# Patient Record
Sex: FEMALE | Race: ASIAN | NOT HISPANIC OR LATINO | URBAN - METROPOLITAN AREA
[De-identification: names, ages, dates, MRNs, and addresses within clinical notes are randomized per-mention and may not be internally consistent; named-entity substitution may affect disease eponyms.]

---

## 2020-03-09 ENCOUNTER — INPATIENT (INPATIENT)
Facility: HOSPITAL | Age: 35
LOS: 1 days | Discharge: HOME | End: 2020-03-11
Attending: SPECIALIST | Admitting: SPECIALIST

## 2020-03-09 VITALS
RESPIRATION RATE: 20 BRPM | HEART RATE: 103 BPM | DIASTOLIC BLOOD PRESSURE: 89 MMHG | TEMPERATURE: 98 F | SYSTOLIC BLOOD PRESSURE: 148 MMHG

## 2020-03-09 DIAGNOSIS — Z3A.00 WEEKS OF GESTATION OF PREGNANCY NOT SPECIFIED: ICD-10-CM

## 2020-03-09 DIAGNOSIS — O26.899 OTHER SPECIFIED PREGNANCY RELATED CONDITIONS, UNSPECIFIED TRIMESTER: ICD-10-CM

## 2020-03-09 LAB
ALBUMIN SERPL ELPH-MCNC: 3.6 G/DL — SIGNIFICANT CHANGE UP (ref 3.3–5)
ALP SERPL-CCNC: 157 U/L — HIGH (ref 40–120)
ALT FLD-CCNC: 54 U/L — HIGH (ref 4–33)
ANION GAP SERPL CALC-SCNC: 16 MMO/L — HIGH (ref 7–14)
APPEARANCE UR: SIGNIFICANT CHANGE UP
APTT BLD: 28.5 SEC — SIGNIFICANT CHANGE UP (ref 27.5–36.3)
AST SERPL-CCNC: 35 U/L — HIGH (ref 4–32)
BACTERIA # UR AUTO: SIGNIFICANT CHANGE UP
BASOPHILS # BLD AUTO: 0.03 K/UL — SIGNIFICANT CHANGE UP (ref 0–0.2)
BASOPHILS NFR BLD AUTO: 0.3 % — SIGNIFICANT CHANGE UP (ref 0–2)
BILIRUB SERPL-MCNC: 0.6 MG/DL — SIGNIFICANT CHANGE UP (ref 0.2–1.2)
BILIRUB UR-MCNC: NEGATIVE — SIGNIFICANT CHANGE UP
BLD GP AB SCN SERPL QL: NEGATIVE — SIGNIFICANT CHANGE UP
BLOOD UR QL VISUAL: NEGATIVE — SIGNIFICANT CHANGE UP
BUN SERPL-MCNC: 6 MG/DL — LOW (ref 7–23)
CALCIUM SERPL-MCNC: 9.4 MG/DL — SIGNIFICANT CHANGE UP (ref 8.4–10.5)
CHLORIDE SERPL-SCNC: 104 MMOL/L — SIGNIFICANT CHANGE UP (ref 98–107)
CO2 SERPL-SCNC: 18 MMOL/L — LOW (ref 22–31)
COLOR SPEC: SIGNIFICANT CHANGE UP
CREAT ?TM UR-MCNC: 53.5 MG/DL — SIGNIFICANT CHANGE UP
CREAT SERPL-MCNC: 0.44 MG/DL — LOW (ref 0.5–1.3)
EOSINOPHIL # BLD AUTO: 0.04 K/UL — SIGNIFICANT CHANGE UP (ref 0–0.5)
EOSINOPHIL NFR BLD AUTO: 0.4 % — SIGNIFICANT CHANGE UP (ref 0–6)
FIBRINOGEN PPP-MCNC: 666 MG/DL — HIGH (ref 300–520)
GLUCOSE BLDC GLUCOMTR-MCNC: 93 MG/DL — SIGNIFICANT CHANGE UP (ref 70–99)
GLUCOSE SERPL-MCNC: 102 MG/DL — HIGH (ref 70–99)
GLUCOSE UR-MCNC: NEGATIVE — SIGNIFICANT CHANGE UP
HCT VFR BLD CALC: 36.9 % — SIGNIFICANT CHANGE UP (ref 34.5–45)
HGB BLD-MCNC: 12.8 G/DL — SIGNIFICANT CHANGE UP (ref 11.5–15.5)
HYALINE CASTS # UR AUTO: NEGATIVE — SIGNIFICANT CHANGE UP
IMM GRANULOCYTES NFR BLD AUTO: 0.9 % — SIGNIFICANT CHANGE UP (ref 0–1.5)
INR BLD: 0.96 — SIGNIFICANT CHANGE UP (ref 0.88–1.17)
KETONES UR-MCNC: NEGATIVE — SIGNIFICANT CHANGE UP
LDH SERPL L TO P-CCNC: 197 U/L — SIGNIFICANT CHANGE UP (ref 135–225)
LEUKOCYTE ESTERASE UR-ACNC: SIGNIFICANT CHANGE UP
LYMPHOCYTES # BLD AUTO: 1.18 K/UL — SIGNIFICANT CHANGE UP (ref 1–3.3)
LYMPHOCYTES # BLD AUTO: 13 % — SIGNIFICANT CHANGE UP (ref 13–44)
MCHC RBC-ENTMCNC: 32.3 PG — SIGNIFICANT CHANGE UP (ref 27–34)
MCHC RBC-ENTMCNC: 34.7 % — SIGNIFICANT CHANGE UP (ref 32–36)
MCV RBC AUTO: 93.2 FL — SIGNIFICANT CHANGE UP (ref 80–100)
MONOCYTES # BLD AUTO: 0.63 K/UL — SIGNIFICANT CHANGE UP (ref 0–0.9)
MONOCYTES NFR BLD AUTO: 6.9 % — SIGNIFICANT CHANGE UP (ref 2–14)
NEUTROPHILS # BLD AUTO: 7.12 K/UL — SIGNIFICANT CHANGE UP (ref 1.8–7.4)
NEUTROPHILS NFR BLD AUTO: 78.5 % — HIGH (ref 43–77)
NITRITE UR-MCNC: NEGATIVE — SIGNIFICANT CHANGE UP
NRBC # FLD: 0 K/UL — SIGNIFICANT CHANGE UP (ref 0–0)
PH UR: 7 — SIGNIFICANT CHANGE UP (ref 5–8)
PLATELET # BLD AUTO: 258 K/UL — SIGNIFICANT CHANGE UP (ref 150–400)
PMV BLD: 11.9 FL — SIGNIFICANT CHANGE UP (ref 7–13)
POTASSIUM SERPL-MCNC: 3.4 MMOL/L — LOW (ref 3.5–5.3)
POTASSIUM SERPL-SCNC: 3.4 MMOL/L — LOW (ref 3.5–5.3)
PROT SERPL-MCNC: 7.3 G/DL — SIGNIFICANT CHANGE UP (ref 6–8.3)
PROT UR-MCNC: 7.8 MG/DL — SIGNIFICANT CHANGE UP
PROT UR-MCNC: NEGATIVE — SIGNIFICANT CHANGE UP
PROTHROM AB SERPL-ACNC: 11 SEC — SIGNIFICANT CHANGE UP (ref 9.8–13.1)
RBC # BLD: 3.96 M/UL — SIGNIFICANT CHANGE UP (ref 3.8–5.2)
RBC # FLD: 13.8 % — SIGNIFICANT CHANGE UP (ref 10.3–14.5)
RBC CASTS # UR COMP ASSIST: SIGNIFICANT CHANGE UP (ref 0–?)
RH IG SCN BLD-IMP: POSITIVE — SIGNIFICANT CHANGE UP
RH IG SCN BLD-IMP: POSITIVE — SIGNIFICANT CHANGE UP
SODIUM SERPL-SCNC: 138 MMOL/L — SIGNIFICANT CHANGE UP (ref 135–145)
SP GR SPEC: 1.01 — SIGNIFICANT CHANGE UP (ref 1–1.04)
SQUAMOUS # UR AUTO: SIGNIFICANT CHANGE UP
URATE SERPL-MCNC: 4.6 MG/DL — SIGNIFICANT CHANGE UP (ref 2.5–7)
UROBILINOGEN FLD QL: NORMAL — SIGNIFICANT CHANGE UP
WBC # BLD: 9.08 K/UL — SIGNIFICANT CHANGE UP (ref 3.8–10.5)
WBC # FLD AUTO: 9.08 K/UL — SIGNIFICANT CHANGE UP (ref 3.8–10.5)
WBC UR QL: HIGH (ref 0–?)

## 2020-03-09 RX ORDER — SIMETHICONE 80 MG/1
80 TABLET, CHEWABLE ORAL EVERY 4 HOURS
Refills: 0 | Status: DISCONTINUED | OUTPATIENT
Start: 2020-03-09 | End: 2020-03-11

## 2020-03-09 RX ORDER — AMPICILLIN TRIHYDRATE 250 MG
1 CAPSULE ORAL EVERY 4 HOURS
Refills: 0 | Status: DISCONTINUED | OUTPATIENT
Start: 2020-03-09 | End: 2020-03-09

## 2020-03-09 RX ORDER — MAGNESIUM HYDROXIDE 400 MG/1
30 TABLET, CHEWABLE ORAL
Refills: 0 | Status: DISCONTINUED | OUTPATIENT
Start: 2020-03-09 | End: 2020-03-11

## 2020-03-09 RX ORDER — OXYTOCIN 10 UNIT/ML
333.33 VIAL (ML) INJECTION
Qty: 20 | Refills: 0 | Status: DISCONTINUED | OUTPATIENT
Start: 2020-03-09 | End: 2020-03-09

## 2020-03-09 RX ORDER — IBUPROFEN 200 MG
600 TABLET ORAL EVERY 6 HOURS
Refills: 0 | Status: COMPLETED | OUTPATIENT
Start: 2020-03-09 | End: 2021-02-05

## 2020-03-09 RX ORDER — SODIUM CHLORIDE 9 MG/ML
1000 INJECTION, SOLUTION INTRAVENOUS
Refills: 0 | Status: DISCONTINUED | OUTPATIENT
Start: 2020-03-09 | End: 2020-03-09

## 2020-03-09 RX ORDER — SODIUM CHLORIDE 9 MG/ML
3 INJECTION INTRAMUSCULAR; INTRAVENOUS; SUBCUTANEOUS EVERY 8 HOURS
Refills: 0 | Status: DISCONTINUED | OUTPATIENT
Start: 2020-03-09 | End: 2020-03-11

## 2020-03-09 RX ORDER — ACETAMINOPHEN 500 MG
975 TABLET ORAL
Refills: 0 | Status: DISCONTINUED | OUTPATIENT
Start: 2020-03-09 | End: 2020-03-11

## 2020-03-09 RX ORDER — PRAMOXINE HYDROCHLORIDE 150 MG/15G
1 AEROSOL, FOAM RECTAL EVERY 4 HOURS
Refills: 0 | Status: DISCONTINUED | OUTPATIENT
Start: 2020-03-09 | End: 2020-03-11

## 2020-03-09 RX ORDER — HYDROCORTISONE 1 %
1 OINTMENT (GRAM) TOPICAL EVERY 6 HOURS
Refills: 0 | Status: DISCONTINUED | OUTPATIENT
Start: 2020-03-09 | End: 2020-03-11

## 2020-03-09 RX ORDER — OXYCODONE HYDROCHLORIDE 5 MG/1
5 TABLET ORAL ONCE
Refills: 0 | Status: DISCONTINUED | OUTPATIENT
Start: 2020-03-09 | End: 2020-03-11

## 2020-03-09 RX ORDER — BENZOCAINE 10 %
1 GEL (GRAM) MUCOUS MEMBRANE EVERY 6 HOURS
Refills: 0 | Status: DISCONTINUED | OUTPATIENT
Start: 2020-03-09 | End: 2020-03-11

## 2020-03-09 RX ORDER — KETOROLAC TROMETHAMINE 30 MG/ML
30 SYRINGE (ML) INJECTION ONCE
Refills: 0 | Status: DISCONTINUED | OUTPATIENT
Start: 2020-03-09 | End: 2020-03-09

## 2020-03-09 RX ORDER — DIBUCAINE 1 %
1 OINTMENT (GRAM) RECTAL EVERY 6 HOURS
Refills: 0 | Status: DISCONTINUED | OUTPATIENT
Start: 2020-03-09 | End: 2020-03-11

## 2020-03-09 RX ORDER — OXYTOCIN 10 UNIT/ML
333.33 VIAL (ML) INJECTION
Qty: 20 | Refills: 0 | Status: DISCONTINUED | OUTPATIENT
Start: 2020-03-09 | End: 2020-03-10

## 2020-03-09 RX ORDER — OXYCODONE HYDROCHLORIDE 5 MG/1
5 TABLET ORAL
Refills: 0 | Status: DISCONTINUED | OUTPATIENT
Start: 2020-03-09 | End: 2020-03-11

## 2020-03-09 RX ORDER — AMPICILLIN TRIHYDRATE 250 MG
2 CAPSULE ORAL ONCE
Refills: 0 | Status: COMPLETED | OUTPATIENT
Start: 2020-03-09 | End: 2020-03-09

## 2020-03-09 RX ORDER — OXYTOCIN 10 UNIT/ML
2 VIAL (ML) INJECTION
Qty: 30 | Refills: 0 | Status: DISCONTINUED | OUTPATIENT
Start: 2020-03-09 | End: 2020-03-09

## 2020-03-09 RX ORDER — IBUPROFEN 200 MG
600 TABLET ORAL EVERY 6 HOURS
Refills: 0 | Status: DISCONTINUED | OUTPATIENT
Start: 2020-03-09 | End: 2020-03-11

## 2020-03-09 RX ORDER — LANOLIN
1 OINTMENT (GRAM) TOPICAL EVERY 6 HOURS
Refills: 0 | Status: DISCONTINUED | OUTPATIENT
Start: 2020-03-09 | End: 2020-03-11

## 2020-03-09 RX ORDER — GLYCERIN ADULT
1 SUPPOSITORY, RECTAL RECTAL AT BEDTIME
Refills: 0 | Status: DISCONTINUED | OUTPATIENT
Start: 2020-03-09 | End: 2020-03-11

## 2020-03-09 RX ORDER — DIPHENHYDRAMINE HCL 50 MG
25 CAPSULE ORAL EVERY 6 HOURS
Refills: 0 | Status: DISCONTINUED | OUTPATIENT
Start: 2020-03-09 | End: 2020-03-11

## 2020-03-09 RX ORDER — AER TRAVELER 0.5 G/1
1 SOLUTION RECTAL; TOPICAL EVERY 4 HOURS
Refills: 0 | Status: DISCONTINUED | OUTPATIENT
Start: 2020-03-09 | End: 2020-03-11

## 2020-03-09 RX ORDER — TETANUS TOXOID, REDUCED DIPHTHERIA TOXOID AND ACELLULAR PERTUSSIS VACCINE, ADSORBED 5; 2.5; 8; 8; 2.5 [IU]/.5ML; [IU]/.5ML; UG/.5ML; UG/.5ML; UG/.5ML
0.5 SUSPENSION INTRAMUSCULAR ONCE
Refills: 0 | Status: DISCONTINUED | OUTPATIENT
Start: 2020-03-09 | End: 2020-03-11

## 2020-03-09 RX ADMIN — SODIUM CHLORIDE 3 MILLILITER(S): 9 INJECTION INTRAMUSCULAR; INTRAVENOUS; SUBCUTANEOUS at 21:41

## 2020-03-09 RX ADMIN — Medication 1 APPLICATION(S): at 23:10

## 2020-03-09 RX ADMIN — Medication 2 MILLIUNIT(S)/MIN: at 18:00

## 2020-03-09 RX ADMIN — PRAMOXINE HYDROCHLORIDE 1 APPLICATION(S): 150 AEROSOL, FOAM RECTAL at 23:10

## 2020-03-09 RX ADMIN — SODIUM CHLORIDE 125 MILLILITER(S): 9 INJECTION, SOLUTION INTRAVENOUS at 18:00

## 2020-03-09 RX ADMIN — Medication 216 GRAM(S): at 15:38

## 2020-03-09 RX ADMIN — Medication 600 MILLIGRAM(S): at 23:10

## 2020-03-09 RX ADMIN — Medication 1 SPRAY(S): at 23:09

## 2020-03-09 RX ADMIN — AER TRAVELER 1 APPLICATION(S): 0.5 SOLUTION RECTAL; TOPICAL at 23:09

## 2020-03-09 NOTE — PROVIDER CONTACT NOTE (OTHER) - ASSESSMENT
Patient symptomatic. Ammonia inhalant and po fluids given. Patient with reports of "feeling much better." Repeat vitals WNL.

## 2020-03-09 NOTE — OB PROVIDER TRIAGE NOTE - HISTORY OF PRESENT ILLNESS
33yo  female  @ 39.1 wks SLIUP GDMA1 here co ctx's Q8-10 min apart. Pt is intact with GFM.     Pmhx-denies  Pshx/Hosp-denies  Meds-PNV  NKDA  Past ob-12/3/59285#9  FT GDMA1  Gyn-denies

## 2020-03-09 NOTE — PROVIDER CONTACT NOTE (OTHER) - SITUATION
NSD from 3/9 at 1948. Patient's orthostatic blood pressures negative. After voiding, patient with complaints of dizziness and observed to be diaphoretic.  Patient given ammonia inhalant & vitals done.

## 2020-03-09 NOTE — CHART NOTE - NSCHARTNOTEFT_GEN_A_CORE
Pt with persistent severe range BPs.  On admission, pt met criteria for sPEC by BP without consecutive severe range BPs.   Pt was started on Mg and received procardia 30xL.   Pt then had two severe range BPs. Hydral 5 pushed.   Repeat 171/82. Hydral 10 pushed.   Repeat BP pending.  HELLP labs pending.   Cat 1 tracing.   IOL not yet started.   julia [facility] attendings and Radha PGY4   Dr. Asmita Sim PGY1

## 2020-03-09 NOTE — OB RN PATIENT PROFILE - ALERT: PERTINENT HISTORY
20 Week Level II Sonogram/1st Trimester Sonogram/Fetal Non-Stress Test (NST)/Ultra Screen at 12 Weeks

## 2020-03-09 NOTE — DISCHARGE NOTE OB - PATIENT PORTAL LINK FT
You can access the FollowMyHealth Patient Portal offered by [facility] by registering at the following website: [email or web address]. By joining [facility]’s FollowMyHealth portal, you will also be able to view your health information using other applications (apps) compatible with our system.

## 2020-03-09 NOTE — OB PROVIDER TRIAGE NOTE - NSHPPHYSICALEXAM_GEN_ALL_CORE
Gen: A&O x 3; NAD  Vitals: BP-148/90; 140/94             P-93; T-36.8    Pulm-CTA B/L; no wheezes/rales/ronchi  Cor-Clear S1S2; no murmurs appreciated  Abd exam-soft and nontender  NST cat I with 140 baseline and mod variability; irreg ctx's    EFW~3118  VE=7.5/90/-2

## 2020-03-09 NOTE — PROGRESS NOTE ADULT - SUBJECTIVE AND OBJECTIVE BOX
C/O contraction  every 10 min , requesting  epidural       Fetal tracing  jaxson gory 1    Benson- contraction  q 10 min .    V/E- 7 cm/90 %/-2 / intact    Plan     epidural for pain management   Pitocin for  augmentation  of labor  Penicillin for  GBS prophylaxis  AROM after 2 nd dose of antibiotic

## 2020-03-09 NOTE — DISCHARGE NOTE OB - CARE PROVIDER_API CALL
Rebecca Mcfarlane)  Obstetrics and Gynecology  [address] [place] [address], [address]  [place] [address]  Phone: [phone]  Fax: [phone]  Follow Up Time:

## 2020-03-09 NOTE — OB PROVIDER H&P - HISTORY OF PRESENT ILLNESS
33yo  female  @ 39.1 wks SLIUP GDMA1 here co ctx's Q8-10 min apart. Pt is intact with GFM.     Pmhx-denies  Pshx/Hosp-denies  Meds-PNV  NKDA  Past ob-12/3/11479#9  FT GDMA1  Gyn-denies

## 2020-03-09 NOTE — OB PROVIDER H&P - ASSESSMENT
35yo  female  @ 39.1 wks SLIUP GDMA2 here co ctx's  -VE-7.5/90/-2  -GBS positive  -pt with some labile BP's; denies sx's of pre-eclampsia  -HELLP labs sent  -pt was dw Dr Mcfarlane; coming in for delivery  -pt unsure if she wants epidural @ this time

## 2020-03-09 NOTE — PROVIDER CONTACT NOTE (OTHER) - BACKGROUND
Orthostatics negative. Patient able to ambulate to bathroom without incident. After voiding, patient symptomatic.

## 2020-03-09 NOTE — OB RN DELIVERY SUMMARY - NS_SEPSISRSKCALC_OBGYN_ALL_OB_FT
EOS calculated successfully. EOS Risk Factor: 0.5/1000 live births ([facility] national incidence); GA=39w1d; Temp=98.2; ROM=0.617; GBS='Positive'; Antibiotics='GBS specific antibiotics > 2 hrs prior to birth'

## 2020-03-09 NOTE — OB RN PATIENT PROFILE - NSPRENATALGBS_OBGYN_ALL_OB_GET_DAYS
CBC Full  -  ( 14 Jul 2017 20:50 )  WBC Count : 10.37 K/uL  Hemoglobin : 12.6 g/dL  Hematocrit : 37.6 %  Platelet Count - Automated : 124 K/uL  Mean Cell Volume : 87.2 fL  Mean Cell Hemoglobin : 29.2 pg  Mean Cell Hemoglobin Concentration : 33.5 %  Auto Neutrophil # : 4.77 K/uL  Auto Lymphocyte # : 4.87 K/uL  Auto Monocyte # : 0.54 K/uL  Auto Eosinophil # : 0.11 K/uL  Auto Basophil # : 0.05 K/uL  Auto Neutrophil % : 45.9 %  Auto Lymphocyte % : 47.0 %  Auto Monocyte % : 5.2 %  Auto Eosinophil % : 1.1 %  Auto Basophil % : 0.5 %    07-14    140  |  102  |  14  ----------------------------<  90  4.2   |  18<L>  |  0.33    Ca    9.9      14 Jul 2017 20:50    TPro  8.0  /  Alb  4.6  /  TBili  0.2  /  DBili  x   /  AST  40<H>  /  ALT  15  /  AlkPhos  227  07-14    U/S: abscess on LLE
21

## 2020-03-09 NOTE — OB PROVIDER TRIAGE NOTE - NSOBPROVIDERNOTE_OBGYN_ALL_OB_FT
33yo  female  @ 39.1 wks SLIUP GDMA2 here co ctx's  -VE-7.5/90/-2  -GBS positive  -pt with some labile BP's; denies sx's of pre-eclampsia  -HELLP labs sent  -pt was dw Dr Mcfarlane; coming in for delivery  -pt unsure if she wants epidural @ this time

## 2020-03-10 LAB — T PALLIDUM AB TITR SER: NEGATIVE — SIGNIFICANT CHANGE UP

## 2020-03-10 RX ADMIN — Medication 975 MILLIGRAM(S): at 23:12

## 2020-03-10 RX ADMIN — SODIUM CHLORIDE 3 MILLILITER(S): 9 INJECTION INTRAMUSCULAR; INTRAVENOUS; SUBCUTANEOUS at 05:46

## 2020-03-10 RX ADMIN — Medication 975 MILLIGRAM(S): at 15:32

## 2020-03-10 RX ADMIN — Medication 600 MILLIGRAM(S): at 05:49

## 2020-03-10 RX ADMIN — Medication 600 MILLIGRAM(S): at 00:07

## 2020-03-10 RX ADMIN — Medication 975 MILLIGRAM(S): at 16:00

## 2020-03-10 RX ADMIN — Medication 600 MILLIGRAM(S): at 06:21

## 2020-03-11 VITALS
RESPIRATION RATE: 18 BRPM | HEART RATE: 77 BPM | OXYGEN SATURATION: 100 % | SYSTOLIC BLOOD PRESSURE: 114 MMHG | TEMPERATURE: 98 F | DIASTOLIC BLOOD PRESSURE: 71 MMHG

## 2020-03-11 RX ORDER — IBUPROFEN 200 MG
1 TABLET ORAL
Qty: 0 | Refills: 0 | DISCHARGE
Start: 2020-03-11

## 2020-03-11 RX ORDER — ACETAMINOPHEN 500 MG
3 TABLET ORAL
Qty: 0 | Refills: 0 | DISCHARGE
Start: 2020-03-11

## 2020-03-11 RX ADMIN — Medication 975 MILLIGRAM(S): at 00:00

## 2020-03-11 RX ADMIN — Medication 600 MILLIGRAM(S): at 09:00

## 2020-03-11 RX ADMIN — Medication 600 MILLIGRAM(S): at 08:35

## 2020-04-26 ENCOUNTER — MESSAGE (OUTPATIENT)
Age: 35
End: 2020-04-26

## 2021-04-30 NOTE — OB RN PATIENT PROFILE - PRESSURE ULCER(S)
CC:  Elissa Jones is here today for Complete Physical.    Medications: currently is not taking any medications  Refills needed today?  NO  Denies known Latex allergy or symptoms of Latex sensitivity.  Patient would like communication of their results via:      Cell Phone:   Telephone Information:   Mobile [phone]     Okay to leave a message containing results? Yes  Tobacco history: verified    Health Maintenance Due   Topic Date Due   • Shingles Vaccine (1 of 2) Never done     Patient is due for topics as listed above but is not proceeding with Immunization(s) Shingles at this time. Education provided for Immunization(s) Shingles..    [facility] status addressed. Patient Active.  Allergies reviewed.            no

## 2022-04-19 ENCOUNTER — OUTPATIENT (OUTPATIENT)
Dept: INPATIENT UNIT | Facility: HOSPITAL | Age: 37
LOS: 1 days | Discharge: HOME | End: 2022-04-19

## 2022-04-19 VITALS
SYSTOLIC BLOOD PRESSURE: 126 MMHG | RESPIRATION RATE: 16 BRPM | HEART RATE: 99 BPM | DIASTOLIC BLOOD PRESSURE: 84 MMHG | TEMPERATURE: 98 F

## 2022-04-19 VITALS — DIASTOLIC BLOOD PRESSURE: 76 MMHG | SYSTOLIC BLOOD PRESSURE: 112 MMHG | HEART RATE: 94 BPM

## 2022-04-19 DIAGNOSIS — Z3A.00 WEEKS OF GESTATION OF PREGNANCY NOT SPECIFIED: ICD-10-CM

## 2022-04-19 DIAGNOSIS — O26.899 OTHER SPECIFIED PREGNANCY RELATED CONDITIONS, UNSPECIFIED TRIMESTER: ICD-10-CM

## 2022-04-19 LAB
BLD GP AB SCN SERPL QL: NEGATIVE — SIGNIFICANT CHANGE UP
FIBRINOGEN PPP-MCNC: 672 MG/DL — HIGH (ref 330–520)
HCT VFR BLD CALC: 38.4 % — SIGNIFICANT CHANGE UP (ref 34.5–45)
HGB BLD-MCNC: 13 G/DL — SIGNIFICANT CHANGE UP (ref 11.5–15.5)
MCHC RBC-ENTMCNC: 31.9 PG — SIGNIFICANT CHANGE UP (ref 27–34)
MCHC RBC-ENTMCNC: 33.9 GM/DL — SIGNIFICANT CHANGE UP (ref 32–36)
MCV RBC AUTO: 94.3 FL — SIGNIFICANT CHANGE UP (ref 80–100)
NRBC # BLD: 0 /100 WBCS — SIGNIFICANT CHANGE UP
NRBC # FLD: 0 K/UL — SIGNIFICANT CHANGE UP
PLATELET # BLD AUTO: 231 K/UL — SIGNIFICANT CHANGE UP (ref 150–400)
RBC # BLD: 4.07 M/UL — SIGNIFICANT CHANGE UP (ref 3.8–5.2)
RBC # FLD: 13.5 % — SIGNIFICANT CHANGE UP (ref 10.3–14.5)
RH IG SCN BLD-IMP: POSITIVE — SIGNIFICANT CHANGE UP
RH IG SCN BLD-IMP: POSITIVE — SIGNIFICANT CHANGE UP
WBC # BLD: 7.92 K/UL — SIGNIFICANT CHANGE UP (ref 3.8–10.5)
WBC # FLD AUTO: 7.92 K/UL — SIGNIFICANT CHANGE UP (ref 3.8–10.5)

## 2022-04-19 PROCEDURE — 99203 OFFICE O/P NEW LOW 30 MIN: CPT

## 2022-04-19 RX ORDER — ACETAMINOPHEN 500 MG
1000 TABLET ORAL ONCE
Refills: 0 | Status: COMPLETED | OUTPATIENT
Start: 2022-04-19 | End: 2022-04-19

## 2022-04-19 RX ADMIN — Medication 1000 MILLIGRAM(S): at 20:56

## 2022-04-19 RX ADMIN — Medication 1000 MILLIGRAM(S): at 20:19

## 2022-04-19 NOTE — OB RN TRIAGE NOTE - FALL HARM RISK - CONCLUSION
Is This A New Presentation, Or A Follow-Up?: Skin Lesion What Type Of Note Output Would You Prefer (Optional)?: Bullet Format How Severe Is Your Skin Lesion?: mild Has Your Skin Lesion Been Treated?: not been treated Universal Safety Interventions

## 2022-04-19 NOTE — OB PROVIDER TRIAGE NOTE - NSHPLABSRESULTS_GEN_ALL_CORE
CBC:                 13.0   7.92  )-----------( 231      ( 19 Apr 2022 19:50 )             38.4     fibrinogen: 672    type and screen: O+

## 2022-04-19 NOTE — OB PROVIDER TRIAGE NOTE - NSHPPHYSICALEXAM_GEN_ALL_CORE
PHYSICAL EXAM  Vital Signs: Vital Signs Last 24 Hrs  T(C): 36.7 (19 Apr 2022 18:24), Max: 36.7 (19 Apr 2022 16:45)  T(F): 98.06 (19 Apr 2022 18:24), Max: 98.1 (19 Apr 2022 16:45)  HR: 88 (19 Apr 2022 18:56) (88 - 103)  BP: 119/76 (19 Apr 2022 18:56) (119/76 - 128/86)  RR: 16 (19 Apr 2022 16:45) (16 - 16)    Gen: NAD  Head: NC/AT  Cardio: S1S2+, RRR  Resp: CTABL, no wheezing  Abdomen: Soft, Non tender, + bowel sound  Extremities: No LE edema bilaterally    NST done to assess fetal surveillance and results as follows:  NST-->FHR: 150 HR baseline, moderate variability, accelerations present, no decelerations, Category 1.  [place]: none noted

## 2022-04-19 NOTE — OB PROVIDER TRIAGE NOTE - NSOBPROVIDERNOTE_OBGYN_ALL_OB_FT
38 yo , EGA@35.4 weeks, presented to D&T with c/o of fall last night at 9PM (Pt report that she slipped on toys and landed on her buttock, Pt complained of constant pelvic pain /10 denies vaginal bleeding,   CBC, Fibrinogen T&S, KB sent result pending. 36 yo , EGA@35.4 weeks, presented to D&T with c/o of fall last night at 9PM (Pt report that she slipped on toys and landed on her buttock, Pt complained of constant pelvic pain 7/10 denies vaginal bleeding,   CBC, Fibrinogen T&S, KB sent result pending.  o+ 38 yo , EGA@35.4 weeks, presented to D&T with c/o of fall last night at 9PM (Pt report that she slipped on toys and landed on her buttock, Pt complained of constant pelvic pain 7/10 denies vaginal bleeding,   CBC, Fibrinogen T&S, KB sent result pending.    19:15 received report from day team:  SVE: closed, long. no evidence bleeding  TAS: anterior placenta, vertex, DREW 9.09, BPP 8/8  EFM: 145 baseline, mod variability, pos accels, no decels  [place]: irregular contractions   blood type - o+  CBC, fibrinogen pending   ordered Tylenol PO 38 yo , EGA@35.4 weeks, presented to D&T with c/o of fall last night at 9PM (Pt report that she slipped on toys and landed on her buttock, Pt complained of constant pelvic pain /10 denies vaginal bleeding,   CBC, Fibrinogen T&S, KB sent result pending.    19:15 received report from day team:  SVE: closed, long. no evidence bleeding  TAS: anterior placenta, vertex, DREW 9.09, BPP 8/8  EFM: 145 baseline, mod variability, pos accels, no decels  [place]: irregular contractions   ordered Tylenol PO--> pt states her pain is relieved   blood type - o+  CBC, fibrinogen - WNL    d/w Dr Sonja Tran --> discharge home  discussed discharge papers with pt, she verbalized understanding need to return with concerns/change in symptoms

## 2022-04-19 NOTE — OB RN TRIAGE NOTE - FALL HARM RISK - UNIVERSAL INTERVENTIONS
Bed in lowest position, wheels locked, appropriate side rails in place/Call bell, personal items and telephone in reach/Instruct patient to call for assistance before getting out of bed or chair/Non-slip footwear when patient is out of bed/[place] to call system/Physically safe environment - no spills, clutter or unnecessary equipment/Purposeful Proactive Rounding/Room/bathroom lighting operational, light cord in reach

## 2022-04-19 NOTE — OB PROVIDER TRIAGE NOTE - HISTORY OF PRESENT ILLNESS
38 yo , EGA@35.4 weeks, presented to D&T with c/o of fall last night at 9PM (Pt report that she slipped on toys and landed on her buttock, Pt complained of constant pelvic pain 7/10 denies vaginal bleeding, leakage of fluid, and reports fetal movement.  Denies fever, chills, headaches, changes in vision, chest pain, palpitations, shortness of breath, cough, nausea, vomiting, diarrhea, constipation, urinary symptoms, edema.      GBS status is negative.  Patient denies signs and symptoms of COVID 19; denies symptomatic illness; fully vaccinated.    No adverse reactions to anesthesia, no objections to blood transfusions if clinically indicated.  OB hx: primigravida  Med hx:  Surg hx:  GYN hx: denies hx of abnormal papsmear/cysts/fibroids/STDs  Meds:  Allergies:    Social hx: Denies alcohol, tobacco, drug use  Psych hx: denies hx of anxiety/depression; lives with spouse   38 yo , EGA@35.4 weeks, presented to D&T with c/o of fall last night at 9PM (Pt report that she slipped on toys and landed on her buttock, Pt complained of constant pelvic pain 7/10 denies vaginal bleeding,  Pt denies leakage of fluid, and reports fetal movement.  Denies fever, chills, headaches, changes in vision, chest pain, palpitations, shortness of breath, cough, nausea, vomiting, diarrhea, constipation, urinary symptoms, edema.      GBS status is unknown  Patient denies signs and symptoms of COVID 19; denies symptomatic illness    No adverse reactions to anesthesia, no objections to blood transfusions if clinically indicated.  OB hx:    x2  12/3/2017, 3/9/2020 complicated with GDMA1  Med hx: denies  Surg hx: denies  GYN hx: denies hx of abnormal papsmear/cysts/fibroids/STDs  Meds: PNV  Allergies: NKDA    Social hx: Denies alcohol, tobacco, drug use  Psych hx: denies hx of anxiety/depression; lives with spouse

## 2022-05-06 ENCOUNTER — TRANSCRIPTION ENCOUNTER (OUTPATIENT)
Age: 37
End: 2022-05-06

## 2022-05-06 ENCOUNTER — INPATIENT (INPATIENT)
Facility: HOSPITAL | Age: 37
LOS: 1 days | Discharge: HOME | End: 2022-05-08
Attending: SPECIALIST | Admitting: SPECIALIST

## 2022-05-06 VITALS
HEART RATE: 82 BPM | SYSTOLIC BLOOD PRESSURE: 140 MMHG | DIASTOLIC BLOOD PRESSURE: 88 MMHG | RESPIRATION RATE: 15 BRPM | TEMPERATURE: 99 F

## 2022-05-06 DIAGNOSIS — O26.899 OTHER SPECIFIED PREGNANCY RELATED CONDITIONS, UNSPECIFIED TRIMESTER: ICD-10-CM

## 2022-05-06 DIAGNOSIS — Z3A.00 WEEKS OF GESTATION OF PREGNANCY NOT SPECIFIED: ICD-10-CM

## 2022-05-06 LAB
ALBUMIN SERPL ELPH-MCNC: 3.2 G/DL — LOW (ref 3.3–5)
ALP SERPL-CCNC: 141 U/L — HIGH (ref 40–120)
ALT FLD-CCNC: 58 U/L — HIGH (ref 4–33)
ANION GAP SERPL CALC-SCNC: 13 MMOL/L — SIGNIFICANT CHANGE UP (ref 7–14)
APTT BLD: 25.2 SEC — LOW (ref 27–36.3)
AST SERPL-CCNC: 40 U/L — HIGH (ref 4–32)
BASOPHILS # BLD AUTO: 0.02 K/UL — SIGNIFICANT CHANGE UP (ref 0–0.2)
BASOPHILS # BLD AUTO: 0.02 K/UL — SIGNIFICANT CHANGE UP (ref 0–0.2)
BASOPHILS NFR BLD AUTO: 0.1 % — SIGNIFICANT CHANGE UP (ref 0–2)
BASOPHILS NFR BLD AUTO: 0.2 % — SIGNIFICANT CHANGE UP (ref 0–2)
BILIRUB SERPL-MCNC: 0.6 MG/DL — SIGNIFICANT CHANGE UP (ref 0.2–1.2)
BLD GP AB SCN SERPL QL: NEGATIVE — SIGNIFICANT CHANGE UP
BUN SERPL-MCNC: 6 MG/DL — LOW (ref 7–23)
CALCIUM SERPL-MCNC: 8.7 MG/DL — SIGNIFICANT CHANGE UP (ref 8.4–10.5)
CHLORIDE SERPL-SCNC: 104 MMOL/L — SIGNIFICANT CHANGE UP (ref 98–107)
CO2 SERPL-SCNC: 17 MMOL/L — LOW (ref 22–31)
COVID-19 SPIKE DOMAIN AB INTERP: POSITIVE
COVID-19 SPIKE DOMAIN ANTIBODY RESULT: >250 U/ML — HIGH
CREAT SERPL-MCNC: 0.4 MG/DL — LOW (ref 0.5–1.3)
EGFR: 132 ML/MIN/1.73M2 — SIGNIFICANT CHANGE UP
EOSINOPHIL # BLD AUTO: 0.01 K/UL — SIGNIFICANT CHANGE UP (ref 0–0.5)
EOSINOPHIL # BLD AUTO: 0.16 K/UL — SIGNIFICANT CHANGE UP (ref 0–0.5)
EOSINOPHIL NFR BLD AUTO: 0.1 % — SIGNIFICANT CHANGE UP (ref 0–6)
EOSINOPHIL NFR BLD AUTO: 1.9 % — SIGNIFICANT CHANGE UP (ref 0–6)
FIBRINOGEN PPP-MCNC: 572 MG/DL — HIGH (ref 330–520)
GLUCOSE BLDC GLUCOMTR-MCNC: 101 MG/DL — HIGH (ref 70–99)
GLUCOSE BLDC GLUCOMTR-MCNC: 102 MG/DL — HIGH (ref 70–99)
GLUCOSE BLDC GLUCOMTR-MCNC: 166 MG/DL — HIGH (ref 70–99)
GLUCOSE SERPL-MCNC: 179 MG/DL — HIGH (ref 70–99)
HCT VFR BLD CALC: 34.6 % — SIGNIFICANT CHANGE UP (ref 34.5–45)
HCT VFR BLD CALC: 37.3 % — SIGNIFICANT CHANGE UP (ref 34.5–45)
HGB BLD-MCNC: 11.8 G/DL — SIGNIFICANT CHANGE UP (ref 11.5–15.5)
HGB BLD-MCNC: 12.9 G/DL — SIGNIFICANT CHANGE UP (ref 11.5–15.5)
IANC: 12.29 K/UL — HIGH (ref 1.8–7.4)
IANC: 5.5 K/UL — SIGNIFICANT CHANGE UP (ref 1.8–7.4)
IMM GRANULOCYTES NFR BLD AUTO: 0.6 % — SIGNIFICANT CHANGE UP (ref 0–1.5)
IMM GRANULOCYTES NFR BLD AUTO: 1.1 % — SIGNIFICANT CHANGE UP (ref 0–1.5)
INR BLD: 1.01 RATIO — SIGNIFICANT CHANGE UP (ref 0.88–1.16)
LDH SERPL L TO P-CCNC: 174 U/L — SIGNIFICANT CHANGE UP (ref 135–225)
LYMPHOCYTES # BLD AUTO: 1.47 K/UL — SIGNIFICANT CHANGE UP (ref 1–3.3)
LYMPHOCYTES # BLD AUTO: 1.78 K/UL — SIGNIFICANT CHANGE UP (ref 1–3.3)
LYMPHOCYTES # BLD AUTO: 10.3 % — LOW (ref 13–44)
LYMPHOCYTES # BLD AUTO: 21.6 % — SIGNIFICANT CHANGE UP (ref 13–44)
MCHC RBC-ENTMCNC: 32.3 PG — SIGNIFICANT CHANGE UP (ref 27–34)
MCHC RBC-ENTMCNC: 32.5 PG — SIGNIFICANT CHANGE UP (ref 27–34)
MCHC RBC-ENTMCNC: 34.1 GM/DL — SIGNIFICANT CHANGE UP (ref 32–36)
MCHC RBC-ENTMCNC: 34.6 GM/DL — SIGNIFICANT CHANGE UP (ref 32–36)
MCV RBC AUTO: 93.3 FL — SIGNIFICANT CHANGE UP (ref 80–100)
MCV RBC AUTO: 95.3 FL — SIGNIFICANT CHANGE UP (ref 80–100)
MONOCYTES # BLD AUTO: 0.38 K/UL — SIGNIFICANT CHANGE UP (ref 0–0.9)
MONOCYTES # BLD AUTO: 0.69 K/UL — SIGNIFICANT CHANGE UP (ref 0–0.9)
MONOCYTES NFR BLD AUTO: 2.7 % — SIGNIFICANT CHANGE UP (ref 2–14)
MONOCYTES NFR BLD AUTO: 8.4 % — SIGNIFICANT CHANGE UP (ref 2–14)
NEUTROPHILS # BLD AUTO: 12.29 K/UL — HIGH (ref 1.8–7.4)
NEUTROPHILS # BLD AUTO: 5.5 K/UL — SIGNIFICANT CHANGE UP (ref 1.8–7.4)
NEUTROPHILS NFR BLD AUTO: 66.8 % — SIGNIFICANT CHANGE UP (ref 43–77)
NEUTROPHILS NFR BLD AUTO: 86.2 % — HIGH (ref 43–77)
NRBC # BLD: 0 /100 WBCS — SIGNIFICANT CHANGE UP
NRBC # BLD: 0 /100 WBCS — SIGNIFICANT CHANGE UP
NRBC # FLD: 0 K/UL — SIGNIFICANT CHANGE UP
NRBC # FLD: 0 K/UL — SIGNIFICANT CHANGE UP
PLATELET # BLD AUTO: 190 K/UL — SIGNIFICANT CHANGE UP (ref 150–400)
PLATELET # BLD AUTO: 197 K/UL — SIGNIFICANT CHANGE UP (ref 150–400)
POTASSIUM SERPL-MCNC: 3.6 MMOL/L — SIGNIFICANT CHANGE UP (ref 3.5–5.3)
POTASSIUM SERPL-SCNC: 3.6 MMOL/L — SIGNIFICANT CHANGE UP (ref 3.5–5.3)
PROT SERPL-MCNC: 5.9 G/DL — LOW (ref 6–8.3)
PROTHROM AB SERPL-ACNC: 11.7 SEC — SIGNIFICANT CHANGE UP (ref 10.5–13.4)
RBC # BLD: 3.63 M/UL — LOW (ref 3.8–5.2)
RBC # BLD: 4 M/UL — SIGNIFICANT CHANGE UP (ref 3.8–5.2)
RBC # FLD: 13.5 % — SIGNIFICANT CHANGE UP (ref 10.3–14.5)
RBC # FLD: 13.5 % — SIGNIFICANT CHANGE UP (ref 10.3–14.5)
RH IG SCN BLD-IMP: POSITIVE — SIGNIFICANT CHANGE UP
SARS-COV-2 IGG+IGM SERPL QL IA: >250 U/ML — HIGH
SARS-COV-2 IGG+IGM SERPL QL IA: POSITIVE
SARS-COV-2 RNA SPEC QL NAA+PROBE: SIGNIFICANT CHANGE UP
SODIUM SERPL-SCNC: 134 MMOL/L — LOW (ref 135–145)
T PALLIDUM AB TITR SER: NEGATIVE — SIGNIFICANT CHANGE UP
URATE SERPL-MCNC: 5.2 MG/DL — SIGNIFICANT CHANGE UP (ref 2.5–7)
WBC # BLD: 14.25 K/UL — HIGH (ref 3.8–10.5)
WBC # BLD: 8.24 K/UL — SIGNIFICANT CHANGE UP (ref 3.8–10.5)
WBC # FLD AUTO: 14.25 K/UL — HIGH (ref 3.8–10.5)
WBC # FLD AUTO: 8.24 K/UL — SIGNIFICANT CHANGE UP (ref 3.8–10.5)

## 2022-05-06 RX ORDER — OXYTOCIN 10 UNIT/ML
333.33 VIAL (ML) INJECTION
Qty: 20 | Refills: 0 | Status: DISCONTINUED | OUTPATIENT
Start: 2022-05-06 | End: 2022-05-08

## 2022-05-06 RX ORDER — SODIUM CHLORIDE 9 MG/ML
1000 INJECTION, SOLUTION INTRAVENOUS
Refills: 0 | Status: DISCONTINUED | OUTPATIENT
Start: 2022-05-06 | End: 2022-05-06

## 2022-05-06 RX ORDER — AMPICILLIN TRIHYDRATE 250 MG
1 CAPSULE ORAL EVERY 4 HOURS
Refills: 0 | Status: DISCONTINUED | OUTPATIENT
Start: 2022-05-06 | End: 2022-05-06

## 2022-05-06 RX ORDER — BENZOCAINE 10 %
1 GEL (GRAM) MUCOUS MEMBRANE EVERY 6 HOURS
Refills: 0 | Status: DISCONTINUED | OUTPATIENT
Start: 2022-05-06 | End: 2022-05-08

## 2022-05-06 RX ORDER — AER TRAVELER 0.5 G/1
1 SOLUTION RECTAL; TOPICAL EVERY 4 HOURS
Refills: 0 | Status: DISCONTINUED | OUTPATIENT
Start: 2022-05-06 | End: 2022-05-08

## 2022-05-06 RX ORDER — ONDANSETRON 8 MG/1
4 TABLET, FILM COATED ORAL ONCE
Refills: 0 | Status: COMPLETED | OUTPATIENT
Start: 2022-05-06 | End: 2022-05-06

## 2022-05-06 RX ORDER — OXYTOCIN 10 UNIT/ML
333.33 VIAL (ML) INJECTION
Qty: 20 | Refills: 0 | Status: COMPLETED | OUTPATIENT
Start: 2022-05-06 | End: 2022-05-06

## 2022-05-06 RX ORDER — TETANUS TOXOID, REDUCED DIPHTHERIA TOXOID AND ACELLULAR PERTUSSIS VACCINE, ADSORBED 5; 2.5; 8; 8; 2.5 [IU]/.5ML; [IU]/.5ML; UG/.5ML; UG/.5ML; UG/.5ML
0.5 SUSPENSION INTRAMUSCULAR ONCE
Refills: 0 | Status: DISCONTINUED | OUTPATIENT
Start: 2022-05-06 | End: 2022-05-08

## 2022-05-06 RX ORDER — SODIUM CHLORIDE 9 MG/ML
3 INJECTION INTRAMUSCULAR; INTRAVENOUS; SUBCUTANEOUS EVERY 8 HOURS
Refills: 0 | Status: DISCONTINUED | OUTPATIENT
Start: 2022-05-06 | End: 2022-05-08

## 2022-05-06 RX ORDER — MAGNESIUM HYDROXIDE 400 MG/1
30 TABLET, CHEWABLE ORAL
Refills: 0 | Status: DISCONTINUED | OUTPATIENT
Start: 2022-05-06 | End: 2022-05-08

## 2022-05-06 RX ORDER — PRAMOXINE HYDROCHLORIDE 150 MG/15G
1 AEROSOL, FOAM RECTAL EVERY 4 HOURS
Refills: 0 | Status: DISCONTINUED | OUTPATIENT
Start: 2022-05-06 | End: 2022-05-08

## 2022-05-06 RX ORDER — OXYTOCIN 10 UNIT/ML
2 VIAL (ML) INJECTION
Qty: 30 | Refills: 0 | Status: DISCONTINUED | OUTPATIENT
Start: 2022-05-06 | End: 2022-05-06

## 2022-05-06 RX ORDER — IBUPROFEN 200 MG
600 TABLET ORAL EVERY 6 HOURS
Refills: 0 | Status: DISCONTINUED | OUTPATIENT
Start: 2022-05-06 | End: 2022-05-08

## 2022-05-06 RX ORDER — ACETAMINOPHEN 500 MG
975 TABLET ORAL
Refills: 0 | Status: DISCONTINUED | OUTPATIENT
Start: 2022-05-06 | End: 2022-05-08

## 2022-05-06 RX ORDER — DIPHENHYDRAMINE HCL 50 MG
25 CAPSULE ORAL EVERY 6 HOURS
Refills: 0 | Status: DISCONTINUED | OUTPATIENT
Start: 2022-05-06 | End: 2022-05-08

## 2022-05-06 RX ORDER — SIMETHICONE 80 MG/1
80 TABLET, CHEWABLE ORAL EVERY 4 HOURS
Refills: 0 | Status: DISCONTINUED | OUTPATIENT
Start: 2022-05-06 | End: 2022-05-08

## 2022-05-06 RX ORDER — HYDROCORTISONE 1 %
1 OINTMENT (GRAM) TOPICAL EVERY 6 HOURS
Refills: 0 | Status: DISCONTINUED | OUTPATIENT
Start: 2022-05-06 | End: 2022-05-08

## 2022-05-06 RX ORDER — AMPICILLIN TRIHYDRATE 250 MG
2 CAPSULE ORAL ONCE
Refills: 0 | Status: COMPLETED | OUTPATIENT
Start: 2022-05-06 | End: 2022-05-06

## 2022-05-06 RX ORDER — DIBUCAINE 1 %
1 OINTMENT (GRAM) RECTAL EVERY 6 HOURS
Refills: 0 | Status: DISCONTINUED | OUTPATIENT
Start: 2022-05-06 | End: 2022-05-08

## 2022-05-06 RX ORDER — KETOROLAC TROMETHAMINE 30 MG/ML
30 SYRINGE (ML) INJECTION ONCE
Refills: 0 | Status: DISCONTINUED | OUTPATIENT
Start: 2022-05-06 | End: 2022-05-06

## 2022-05-06 RX ORDER — SODIUM CHLORIDE 9 MG/ML
1000 INJECTION, SOLUTION INTRAVENOUS ONCE
Refills: 0 | Status: COMPLETED | OUTPATIENT
Start: 2022-05-06 | End: 2022-05-06

## 2022-05-06 RX ORDER — LANOLIN
1 OINTMENT (GRAM) TOPICAL EVERY 6 HOURS
Refills: 0 | Status: DISCONTINUED | OUTPATIENT
Start: 2022-05-06 | End: 2022-05-08

## 2022-05-06 RX ORDER — IBUPROFEN 200 MG
600 TABLET ORAL EVERY 6 HOURS
Refills: 0 | Status: COMPLETED | OUTPATIENT
Start: 2022-05-06 | End: 2023-04-04

## 2022-05-06 RX ADMIN — SODIUM CHLORIDE 3 MILLILITER(S): 9 INJECTION INTRAMUSCULAR; INTRAVENOUS; SUBCUTANEOUS at 15:31

## 2022-05-06 RX ADMIN — Medication 30 MILLIGRAM(S): at 15:32

## 2022-05-06 RX ADMIN — Medication 216 GRAM(S): at 07:11

## 2022-05-06 RX ADMIN — SODIUM CHLORIDE 1000 MILLILITER(S): 9 INJECTION, SOLUTION INTRAVENOUS at 13:31

## 2022-05-06 RX ADMIN — Medication 975 MILLIGRAM(S): at 21:25

## 2022-05-06 RX ADMIN — SODIUM CHLORIDE 125 MILLILITER(S): 9 INJECTION, SOLUTION INTRAVENOUS at 08:16

## 2022-05-06 RX ADMIN — Medication 975 MILLIGRAM(S): at 21:55

## 2022-05-06 RX ADMIN — Medication 1000 MILLIUNIT(S)/MIN: at 11:36

## 2022-05-06 RX ADMIN — ONDANSETRON 4 MILLIGRAM(S): 8 TABLET, FILM COATED ORAL at 08:16

## 2022-05-06 RX ADMIN — Medication 2 MILLIUNIT(S)/MIN: at 10:06

## 2022-05-06 NOTE — DISCHARGE NOTE OB - REASON FOR ADMISSION
Admitted in labor How Severe Are Your Spot(S)?: mild What Is The Reason For Today's Visit?: Full Body Skin Examination What Is The Reason For Today's Visit? (Being Monitored For X): the development of new lesions

## 2022-05-06 NOTE — CHART NOTE - NSCHARTNOTEFT_GEN_A_CORE
1317    House officer at the bedside after being notified by RN regarding positive orthostats accompanied by headache, palpitations, and an elevated BP. On eval, patient reports headache as resolved w/ position change. Denies any palpitations at this time. Denies scotomas. Denies chest pain or shortness of breath. Denies significant VB.    Gen: No acute distress. Awake. Alert  CV: Regular rate and rhythm. No murmurs appreciated  Pulm: Clear to auscultation bilaterally. No respiratory distress. No wheezes, rales, or rhonchi  Abd: Soft. Non-tender. Fundus firm w/ rightward deviation. No significant bleeding expressed w/ deep palpation    ICU Vital Signs Last 24 Hrs  T(C): 37 (06 May 2022 11:42), Max: 37.1 (06 May 2022 06:20)  T(F): 98.6 (06 May 2022 11:42), Max: 98.8 (06 May 2022 06:20)  HR: 83 (06 May 2022 13:20) (65 - 114)  BP: 136/76 (06 May 2022 13:12) (110/59 - 144/81)  BP(mean): --  ABP: --  ABP(mean): --  RR: 16 (06 May 2022 08:20) (15 - 16)  SpO2: 98% (06 May 2022 13:25) (94% - 100%)    Orthostatic VS    22 @ 13:05  Lying BP: 132/77 HR: 73   Sitting BP: 141/77 HR: 66  Standing BP: 143/70 HR: 103  Site: --   Mode: --    A/P: PPD#0 from  w/ QBL of 214cc w/ positive orthostats 1317    House officer at the bedside after being notified by RN regarding positive orthostats accompanied by headache, palpitations, and an elevated BP. On eval, patient reports headache as resolved w/ position change. Denies any palpitations at this time. Denies scotomas. Denies chest pain or shortness of breath. Denies significant VB.    Gen: No acute distress. Awake. Alert  CV: Regular rate and rhythm. No murmurs appreciated  Pulm: Clear to auscultation bilaterally. No respiratory distress. No wheezes, rales, or rhonchi  Abd: Soft. Non-tender. Fundus firm w/ rightward deviation. No significant bleeding expressed w/ deep palpation    ICU Vital Signs Last 24 Hrs  T(C): 37 (06 May 2022 11:42), Max: 37.1 (06 May 2022 06:20)  T(F): 98.6 (06 May 2022 11:42), Max: 98.8 (06 May 2022 06:20)  HR: 83 (06 May 2022 13:20) (65 - 114)  BP: 136/76 (06 May 2022 13:12) (110/59 - 144/81)  BP(mean): --  ABP: --  ABP(mean): --  RR: 16 (06 May 2022 08:20) (15 - 16)  SpO2: 98% (06 May 2022 13:25) (94% - 100%)    Orthostatic VS    22 @ 13:05  Lying BP: 132/77 HR: 73   Sitting BP: 141/77 HR: 66  Standing BP: 143/70 HR: 103  Site: --   Mode: --    A/P: PPD#0 from  w/ QBL of 214cc w/ positive orthostats in addition to being symptomatic. Will administer 1L fluid bolus. In addition, w/ regards to the elevated BP, will obtain HELLP labs (unable to obtain urine 2/2 to post-partum). Patient does not currently meet criteria for any hypertensive d/o of pregnancy     Wu Kelley  PGY-1, Obstetrics & Gynecology 1317    House officer at the bedside after being notified by RN regarding positive orthostats accompanied by headache, palpitations, and an elevated BP. On eval, patient reports headache as resolved w/ position change. Denies any palpitations at this time. Denies scotomas. Denies chest pain or shortness of breath. Denies significant VB.    Gen: No acute distress. Awake. Alert  CV: Regular rate and rhythm. No murmurs appreciated  Pulm: Clear to auscultation bilaterally. No respiratory distress. No wheezes, rales, or rhonchi  Abd: Soft. Non-tender. Fundus firm w/ rightward deviation. No significant bleeding expressed w/ deep palpation    ICU Vital Signs Last 24 Hrs  T(C): 37 (06 May 2022 11:42), Max: 37.1 (06 May 2022 06:20)  T(F): 98.6 (06 May 2022 11:42), Max: 98.8 (06 May 2022 06:20)  HR: 83 (06 May 2022 13:20) (65 - 114)  BP: 136/76 (06 May 2022 13:12) (110/59 - 144/81)  BP(mean): --  ABP: --  ABP(mean): --  RR: 16 (06 May 2022 08:20) (15 - 16)  SpO2: 98% (06 May 2022 13:25) (94% - 100%)    Orthostatic VS    22 @ 13:05  Lying BP: 132/77 HR: 73   Sitting BP: 141/77 HR: 66  Standing BP: 143/70 HR: 103  Site: --   Mode: --    A/P: PPD#0 from  w/ QBL of 214cc w/ positive orthostats in addition to being symptomatic. Will administer 1L fluid bolus. In addition, w/ regards to the elevated BP, will obtain HELLP labs (unable to obtain urine 2/2 to post-partum). Patient does not currently meet criteria for any hypertensive d/o of pregnancy     Wu Kelley  PGY-1, Obstetrics & Gynecology    1510: Pt s/p 1L bolus. Failed orthostats, but pt asx. HELLP labs returned with mildly increased AST/ALT (40/58) but does not meet criteria for PEC w/ severe features. Okay for post-partum floor    d/w Dr. Mcfarlane

## 2022-05-06 NOTE — OB RN PATIENT PROFILE - FUNCTIONAL ASSESSMENT - DAILY ACTIVITY 1.
From: Harmeet Jacobs  To: Patty Richards  Sent: 5/4/2022 1:02 PM CDT  Subject: Moms test results    Was looking at moms test results if I'm reading it right she still has the bladder Infection and if so is that gonna cause a problom with getting the kidney stone procedure done?   4 = No assist / stand by assistance

## 2022-05-06 NOTE — OB PROVIDER H&P - PROBLEM SELECTOR PLAN 1
-Admit l&d. Routine labs   -Expectant management of labor  -Fetus: cat 1 tracing, vertex presentation, continuous monitoring  -Ampicillin for gbs prophylaxis  -GDM protocol   -Pain: patient approved for epidural   -Covid 19 pending for patient  -Consents signed and witnessed at bedside

## 2022-05-06 NOTE — OB RN PATIENT PROFILE - FALL HARM RISK - UNIVERSAL INTERVENTIONS
Bed in lowest position, wheels locked, appropriate side rails in place/Call bell, personal items and telephone in reach/Instruct patient to call for assistance before getting out of bed or chair/Non-slip footwear when patient is out of bed/[place] to call system/Physically safe environment - no spills, clutter or unnecessary equipment/Purposeful Proactive Rounding/Room/bathroom lighting operational, light cord in reach

## 2022-05-06 NOTE — OB PROVIDER H&P - HISTORY OF PRESENT ILLNESS
36 y/o pt 38 weeks  with known GDM A2 presents to triage with c/o worsening contractions since 400. pt denies any lof or bleeding. pt reports 10/10 pain with contraction. pt denies n/v/d, fever or chills. pt endorses +fetal movement   AP complicated by:  -GDM A2 on Humalog 6 units @ bedtime     NKDA  PMH: denies  PSH: denies  OB:   2017 FT 6#11 GDM A1   2020 FT 7# GDMA1  GYN:  denies fibroids/cysts/stds  Social hx:  denies etoh/smoking/ illicit drugs  Medication:  PNV

## 2022-05-06 NOTE — DISCHARGE NOTE OB - CARE PROVIDER_API CALL
Rebecca Mcfarlane  OBSTETRICS AND GYNECOLOGY  [address], [address]  [place] [address]  Phone: [phone]  Fax: [phone]  Follow Up Time:

## 2022-05-06 NOTE — OB PROVIDER IHI INDUCTION/AUGMENTATION NOTE - NS_CHECKALL_OBGYN_ALL_OB
RN cannot approve Refill Request    RN can NOT refill this medication historical medication requested. Last office visit: 10/7/2019 Rain Martinez MD Last Physical: Visit date not found Last MTM visit: Visit date not found Last visit same specialty: 10/7/2019 Rain Martinez MD.  Next visit within 3 mo: Visit date not found  Next physical within 3 mo: Visit date not found      Maureen Hickey, [facility] Triage/Med Refill 11/9/2019    Requested Prescriptions   Pending Prescriptions Disp Refills     potassium chloride (K-DUR,KLOR-CON) 20 MEQ tablet 30 tablet 3     Sig: Take 1 tablet (20 mEq total) by mouth daily.       Potassium Supplements Refill Protocol Passed - 11/9/2019  1:00 PM        Passed - PCP or prescribing provider visit in past 12 months       Last office visit with prescriber/PCP: 10/7/2019 Rain Martinez MD OR same dept: 10/7/2019 Rain Martinez MD OR same specialty: 10/7/2019 Rain Martinez MD  Last physical: Visit date not found Last MTM visit: Visit date not found   Next visit within 3 mo: Visit date not found  Next physical within 3 mo: Visit date not found  Prescriber OR PCP: Rain Martinez MD  Last diagnosis associated with med order: There are no diagnoses linked to this encounter.  If protocol passes may refill for 12 months if within 3 months of last provider visit (or a total of 15 months).             Passed - Potassium level in last 12 months     Lab Results   Component Value Date    Potassium 4.1 05/14/2019                
Refill Request  Did you contact pharmacy: No  Medication name: Klor-Con    Who prescribed the medication: Last ordered by Dr. Light   Pharmacy Name and Location: [facility] [place] on file   Is patient out of medication: Yes  Patient notified refills processed in 72 hours:  yes  Okay to leave a detailed message: yes    Patient states prescription is   
Order was written/H&P was completed/Contractions pattern was reviewed/FHR was reviewed/Induction / Augmentation was discussed

## 2022-05-06 NOTE — OB PROVIDER H&P - NSHPPHYSICALEXAM_GEN_ALL_CORE
Vital Signs Last 24 Hrs  T(C): 37.1 (06 May 2022 06:20), Max: 37.1 (06 May 2022 06:20)  T(F): 98.8 (06 May 2022 06:20), Max: 98.8 (06 May 2022 06:20)  HR: 87 (06 May 2022 06:46) (82 - 87)  BP: 122/80 (06 May 2022 06:46) (122/80 - 140/88)  BP(mean): --  RR: 15 (06 May 2022 06:20) (15 - 15)  SpO2: --    Abdomen: soft, non tender. no guarding or rebound tenderness  SVE: 5.5/80/-2  TAS: vertex presentation  EFW 2863gm by Leopold's     NST in progress

## 2022-05-06 NOTE — OB PROVIDER DELIVERY SUMMARY - NS_BIRTHTRAUMAA_OBGYN_ALL_OB
TRANSFER - OUT REPORT:    Verbal report given to Josh RN(name) on Jany Martin  being transferred to 3E(unit) for routine progression of care       Report consisted of patients Situation, Background, Assessment and   Recommendations(SBAR). Information from the following report(s) SBAR, Kardex, ED Summary, [facility] and Recent Results was reviewed with the receiving nurse. Lines:   Peripheral IV 07/13/21 Right Antecubital (Active)   Site Assessment Clean, dry, & intact 07/13/21 1428   Phlebitis Assessment 0 07/13/21 1428   Infiltration Assessment 0 07/13/21 1428   Dressing Status Clean, dry, & intact 07/13/21 1428   Dressing Type Transparent 07/13/21 1428   Hub Color/Line Status Pink 07/13/21 1428   Action Taken Blood drawn 07/13/21 1428        Opportunity for questions and clarification was provided.       Patient transported with:   Registered Nurse
None

## 2022-05-06 NOTE — PROVIDER CONTACT NOTE (OTHER) - ASSESSMENT
pt aox4, endorses relief of palpitations when returned to bed, c/o headache is stil present. breathing even, non labored. uterus firm, moderate bleeding noted during PP check, no clots expressed. pt denies chest pain, SOB, vision changes, abdominal pain, n/v.

## 2022-05-06 NOTE — OB PROVIDER H&P - ASSESSMENT
38 y/o pt 38 weeks  presents in labor   d/w Dr. Tran   Plan:  -Admit l&d. Routine labs   -Expectant management of labor  -Fetus: cat 1 tracing, vertex presentation, continuous monitoring  -Ampicillin for gbs prophylaxis  -GDM protocol   -Pain: patient approved for epidural   -Covid 19 pending for patient  -Consents signed and witnessed at bedside

## 2022-05-06 NOTE — OB RN DELIVERY SUMMARY - NSSELHIDDEN_OBGYN_ALL_OB_FT
[NS_DeliveryAttending1_OBGYN_ALL_OB_FT:[id number]],[NS_DeliveryRN_OBGYN_ALL_OB_FT:[id number]=]

## 2022-05-06 NOTE — OB RN DELIVERY SUMMARY - NS_SEPSISRSKCALC_OBGYN_ALL_OB_FT
EOS calculated successfully. EOS Risk Factor: 0.5/1000 live births ([facility] national incidence); GA=38w;Temp=98.8; ROM=2.117; GBS='Positive'; Antibiotics='GBS specific antibiotics > 2 hrs prior to birth'

## 2022-05-06 NOTE — PROVIDER CONTACT NOTE (OTHER) - BACKGROUND
, s/p  , hx of GDMA, c/o palpitations and L sided headache when standing for evaluation of orthostatic VS.

## 2022-05-06 NOTE — DISCHARGE NOTE OB - MATERIALS PROVIDED
Vaccinations/  Immunization Record/Guide to Postpartum Care/Back To Sleep Handout/Shaken Baby Prevention Handout/Birth Certificate Instructions/Discharge Medication Information for Patients and Families Pocket Guide

## 2022-05-06 NOTE — DISCHARGE NOTE OB - NS MD DC FALL RISK RISK
For information on Fall & Injury Prevention, visit: [email or web address] OR  [email or web address] OR  https://www.cdc.gov/steadi/patient.html

## 2022-05-06 NOTE — DISCHARGE NOTE OB - PATIENT PORTAL LINK FT
You can access the FollowMyHealth Patient Portal offered by [facility] by registering at the following website: [email or web address]. By joining [employer]’s FollowMyHealth portal, you will also be able to view your health information using other applications (apps) compatible with our system.

## 2022-05-06 NOTE — DISCHARGE NOTE OB - CARE PLAN
1 Principal Discharge DX:	Normal vaginal delivery  Assessment and plan of treatment:	follow up 6 weeks/ regular diet & activity as tolerate

## 2022-05-07 RX ORDER — INSULIN LISPRO 100/ML
6 VIAL (ML) SUBCUTANEOUS
Qty: 0 | Refills: 0 | DISCHARGE

## 2022-05-07 RX ADMIN — Medication 975 MILLIGRAM(S): at 22:15

## 2022-05-07 RX ADMIN — Medication 600 MILLIGRAM(S): at 18:34

## 2022-05-07 RX ADMIN — SODIUM CHLORIDE 3 MILLILITER(S): 9 INJECTION INTRAMUSCULAR; INTRAVENOUS; SUBCUTANEOUS at 22:30

## 2022-05-07 RX ADMIN — Medication 975 MILLIGRAM(S): at 03:06

## 2022-05-07 RX ADMIN — SODIUM CHLORIDE 3 MILLILITER(S): 9 INJECTION INTRAMUSCULAR; INTRAVENOUS; SUBCUTANEOUS at 14:25

## 2022-05-07 RX ADMIN — Medication 975 MILLIGRAM(S): at 21:39

## 2022-05-07 RX ADMIN — Medication 600 MILLIGRAM(S): at 06:17

## 2022-05-07 RX ADMIN — Medication 975 MILLIGRAM(S): at 09:30

## 2022-05-07 RX ADMIN — Medication 600 MILLIGRAM(S): at 00:57

## 2022-05-07 RX ADMIN — Medication 975 MILLIGRAM(S): at 03:36

## 2022-05-07 RX ADMIN — Medication 1 TABLET(S): at 11:55

## 2022-05-07 RX ADMIN — Medication 600 MILLIGRAM(S): at 13:00

## 2022-05-07 RX ADMIN — Medication 600 MILLIGRAM(S): at 19:03

## 2022-05-07 RX ADMIN — Medication 975 MILLIGRAM(S): at 16:30

## 2022-05-07 RX ADMIN — Medication 975 MILLIGRAM(S): at 08:25

## 2022-05-07 RX ADMIN — Medication 600 MILLIGRAM(S): at 06:47

## 2022-05-07 RX ADMIN — Medication 600 MILLIGRAM(S): at 11:55

## 2022-05-07 RX ADMIN — Medication 600 MILLIGRAM(S): at 00:27

## 2022-05-07 RX ADMIN — Medication 975 MILLIGRAM(S): at 15:37

## 2022-05-07 NOTE — CHART NOTE - NSCHARTNOTEFT_GEN_A_CORE
Pt. was seen @ 6:40 by Dr. Chairez on morning rounds    Pt. was seen by NP @ 9:00 am to Review Obstetrical complication associated w/ high blood  pressure  -Has BP Cuff at home, offered a prescription for BP cuff but patient declined   --instructions given on BP monitoring; TID; call MD office if greater than 140/90; report to triage is greater than 160/100  --reviewed signs and symptoms related to preeclampsia with patient such as HA, Change in vision, N/V. RUQ pain   --keep log BP's to present to MD during appointment or triage vist  -PreEclampsia Handout was reviewed w/pt. & given to pt. verbalized understanding  -All questions answered, patient verbalized understanding     Zulema Garland NP Pt. was seen @ 6:40 by Dr. Chairez on morning rounds    Pt. was seen by NP @ 9:00 am to Review Obstetrical complication associated w/ high blood  pressure  -Has BP Cuff at home, offered a prescription for BP cuff but patient declined   --instructions given on BP monitoring; TID; call MD office if greater than 140/90; report to triage is greater than 160/100  --reviewed signs and symptoms related to preeclampsia with patient such as HA, Change in vision, N/V. RUQ pain   --keep log BP's to present to MD during appointment or triage vist  -Pre-eclampsia Handout was reviewed w/pt. & given to pt. verbalized understanding  -All questions answered, patient verbalized understanding   -Pt. was also encouraged to cont monitoring diet & level of activity, GTT to be repeated in 6 wks.  -Reviewed S&S of hypo/hyperglycemia, notify OB office w/ all concerns  Zulema Garland NP

## 2022-05-08 VITALS
TEMPERATURE: 98 F | RESPIRATION RATE: 17 BRPM | DIASTOLIC BLOOD PRESSURE: 67 MMHG | HEART RATE: 76 BPM | OXYGEN SATURATION: 100 % | SYSTOLIC BLOOD PRESSURE: 124 MMHG

## 2022-05-08 RX ADMIN — SODIUM CHLORIDE 3 MILLILITER(S): 9 INJECTION INTRAMUSCULAR; INTRAVENOUS; SUBCUTANEOUS at 05:53

## 2022-05-08 RX ADMIN — Medication 975 MILLIGRAM(S): at 10:00

## 2022-05-08 RX ADMIN — Medication 600 MILLIGRAM(S): at 05:25

## 2022-05-08 RX ADMIN — Medication 975 MILLIGRAM(S): at 09:35

## 2022-05-08 RX ADMIN — Medication 600 MILLIGRAM(S): at 06:19

## 2022-05-14 ENCOUNTER — INPATIENT (INPATIENT)
Facility: HOSPITAL | Age: 37
LOS: 0 days | Discharge: HOME | End: 2022-05-15
Attending: SPECIALIST | Admitting: SPECIALIST
Payer: MEDICAID

## 2022-05-14 VITALS — TEMPERATURE: 98 F

## 2022-05-14 DIAGNOSIS — O16.9 UNSPECIFIED MATERNAL HYPERTENSION, UNSPECIFIED TRIMESTER: ICD-10-CM

## 2022-05-14 PROBLEM — Z78.9 OTHER SPECIFIED HEALTH STATUS: Chronic | Status: ACTIVE | Noted: 2022-05-06

## 2022-05-14 LAB
ALBUMIN SERPL ELPH-MCNC: 4.4 G/DL — SIGNIFICANT CHANGE UP (ref 3.3–5)
ALP SERPL-CCNC: 147 U/L — HIGH (ref 40–120)
ALT FLD-CCNC: 43 U/L — HIGH (ref 4–33)
ANION GAP SERPL CALC-SCNC: 14 MMOL/L — SIGNIFICANT CHANGE UP (ref 7–14)
APTT BLD: 33.8 SEC — SIGNIFICANT CHANGE UP (ref 27–36.3)
AST SERPL-CCNC: 28 U/L — SIGNIFICANT CHANGE UP (ref 4–32)
BASOPHILS # BLD AUTO: 0.05 K/UL — SIGNIFICANT CHANGE UP (ref 0–0.2)
BASOPHILS NFR BLD AUTO: 0.5 % — SIGNIFICANT CHANGE UP (ref 0–2)
BILIRUB SERPL-MCNC: 0.4 MG/DL — SIGNIFICANT CHANGE UP (ref 0.2–1.2)
BLD GP AB SCN SERPL QL: NEGATIVE — SIGNIFICANT CHANGE UP
BUN SERPL-MCNC: 13 MG/DL — SIGNIFICANT CHANGE UP (ref 7–23)
CALCIUM SERPL-MCNC: 10 MG/DL — SIGNIFICANT CHANGE UP (ref 8.4–10.5)
CHLORIDE SERPL-SCNC: 104 MMOL/L — SIGNIFICANT CHANGE UP (ref 98–107)
CO2 SERPL-SCNC: 21 MMOL/L — LOW (ref 22–31)
CREAT SERPL-MCNC: 0.5 MG/DL — SIGNIFICANT CHANGE UP (ref 0.5–1.3)
EGFR: 125 ML/MIN/1.73M2 — SIGNIFICANT CHANGE UP
EOSINOPHIL # BLD AUTO: 0.38 K/UL — SIGNIFICANT CHANGE UP (ref 0–0.5)
EOSINOPHIL NFR BLD AUTO: 4 % — SIGNIFICANT CHANGE UP (ref 0–6)
FIBRINOGEN PPP-MCNC: 595 MG/DL — HIGH (ref 330–520)
GLUCOSE SERPL-MCNC: 99 MG/DL — SIGNIFICANT CHANGE UP (ref 70–99)
HCT VFR BLD CALC: 42.9 % — SIGNIFICANT CHANGE UP (ref 34.5–45)
HGB BLD-MCNC: 14.6 G/DL — SIGNIFICANT CHANGE UP (ref 11.5–15.5)
IANC: 5.59 K/UL — SIGNIFICANT CHANGE UP (ref 1.8–7.4)
IMM GRANULOCYTES NFR BLD AUTO: 1.3 % — SIGNIFICANT CHANGE UP (ref 0–1.5)
INR BLD: 1.01 RATIO — SIGNIFICANT CHANGE UP (ref 0.88–1.16)
LDH SERPL L TO P-CCNC: 239 U/L — HIGH (ref 135–225)
LYMPHOCYTES # BLD AUTO: 2.36 K/UL — SIGNIFICANT CHANGE UP (ref 1–3.3)
LYMPHOCYTES # BLD AUTO: 25.1 % — SIGNIFICANT CHANGE UP (ref 13–44)
MCHC RBC-ENTMCNC: 32.2 PG — SIGNIFICANT CHANGE UP (ref 27–34)
MCHC RBC-ENTMCNC: 34 GM/DL — SIGNIFICANT CHANGE UP (ref 32–36)
MCV RBC AUTO: 94.5 FL — SIGNIFICANT CHANGE UP (ref 80–100)
MONOCYTES # BLD AUTO: 0.89 K/UL — SIGNIFICANT CHANGE UP (ref 0–0.9)
MONOCYTES NFR BLD AUTO: 9.5 % — SIGNIFICANT CHANGE UP (ref 2–14)
NEUTROPHILS # BLD AUTO: 5.59 K/UL — SIGNIFICANT CHANGE UP (ref 1.8–7.4)
NEUTROPHILS NFR BLD AUTO: 59.6 % — SIGNIFICANT CHANGE UP (ref 43–77)
NRBC # BLD: 0 /100 WBCS — SIGNIFICANT CHANGE UP
NRBC # FLD: 0 K/UL — SIGNIFICANT CHANGE UP
PLATELET # BLD AUTO: 344 K/UL — SIGNIFICANT CHANGE UP (ref 150–400)
POTASSIUM SERPL-MCNC: 4.1 MMOL/L — SIGNIFICANT CHANGE UP (ref 3.5–5.3)
POTASSIUM SERPL-SCNC: 4.1 MMOL/L — SIGNIFICANT CHANGE UP (ref 3.5–5.3)
PROT SERPL-MCNC: 7.6 G/DL — SIGNIFICANT CHANGE UP (ref 6–8.3)
PROTHROM AB SERPL-ACNC: 11.7 SEC — SIGNIFICANT CHANGE UP (ref 10.5–13.4)
RBC # BLD: 4.54 M/UL — SIGNIFICANT CHANGE UP (ref 3.8–5.2)
RBC # FLD: 13.2 % — SIGNIFICANT CHANGE UP (ref 10.3–14.5)
RH IG SCN BLD-IMP: POSITIVE — SIGNIFICANT CHANGE UP
SARS-COV-2 RNA SPEC QL NAA+PROBE: SIGNIFICANT CHANGE UP
SODIUM SERPL-SCNC: 139 MMOL/L — SIGNIFICANT CHANGE UP (ref 135–145)
URATE SERPL-MCNC: 5.9 MG/DL — SIGNIFICANT CHANGE UP (ref 2.5–7)
WBC # BLD: 9.39 K/UL — SIGNIFICANT CHANGE UP (ref 3.8–10.5)
WBC # FLD AUTO: 9.39 K/UL — SIGNIFICANT CHANGE UP (ref 3.8–10.5)

## 2022-05-14 PROCEDURE — 93010 ELECTROCARDIOGRAM REPORT: CPT

## 2022-05-14 RX ORDER — LANOLIN
1 OINTMENT (GRAM) TOPICAL EVERY 6 HOURS
Refills: 0 | Status: DISCONTINUED | OUTPATIENT
Start: 2022-05-14 | End: 2022-05-15

## 2022-05-14 RX ORDER — LABETALOL HCL 100 MG
200 TABLET ORAL
Refills: 0 | Status: DISCONTINUED | OUTPATIENT
Start: 2022-05-14 | End: 2022-05-15

## 2022-05-14 RX ORDER — PRAMOXINE HYDROCHLORIDE 150 MG/15G
1 AEROSOL, FOAM RECTAL EVERY 4 HOURS
Refills: 0 | Status: DISCONTINUED | OUTPATIENT
Start: 2022-05-14 | End: 2022-05-15

## 2022-05-14 RX ORDER — HYDROCORTISONE 1 %
1 OINTMENT (GRAM) TOPICAL EVERY 6 HOURS
Refills: 0 | Status: DISCONTINUED | OUTPATIENT
Start: 2022-05-14 | End: 2022-05-15

## 2022-05-14 RX ORDER — DIPHENHYDRAMINE HCL 50 MG
25 CAPSULE ORAL EVERY 6 HOURS
Refills: 0 | Status: DISCONTINUED | OUTPATIENT
Start: 2022-05-14 | End: 2022-05-15

## 2022-05-14 RX ORDER — BENZOCAINE 10 %
1 GEL (GRAM) MUCOUS MEMBRANE EVERY 6 HOURS
Refills: 0 | Status: DISCONTINUED | OUTPATIENT
Start: 2022-05-14 | End: 2022-05-15

## 2022-05-14 RX ORDER — DIBUCAINE 1 %
1 OINTMENT (GRAM) RECTAL EVERY 6 HOURS
Refills: 0 | Status: DISCONTINUED | OUTPATIENT
Start: 2022-05-14 | End: 2022-05-15

## 2022-05-14 RX ADMIN — Medication 200 MILLIGRAM(S): at 16:41

## 2022-05-14 NOTE — H&P ADULT - HISTORY OF PRESENT ILLNESS
Pt is a 38yo  postpartum day 8 from an  (22 at 38weeks admitted in labor) presenting to triage with blood pressures at home of 130s/100s. OB course complicated by GDMA2 and gHTN postpartum. Patient was discharged with home BP monitoring (no medications). Patient denies headaches, visual changes, RUQ pain. Patient denies palpitations, chest pain, shortness of breathe. She endorses some swelling in her fingers and toes. Patient was monitoring BP at home, concerned due to elevated diastolic readings. Pt has no other OB/ postpartum complaints at this time. Patient with BPs 120-160s/80-100s in triage. Patient being admitted for BP monitoring and initiation of Labetalol 200mg BID.     Allergies:NKDA  Meds: PNV    Medhx: pt denies  Obhx:  2022 at 38 weeks c/b ghtn and gdma2;   at 39 weeks uncomplicated,  2017 uncomplicated  Gynhx: pt denies cysts, fibroids, abn paps,stds  Sxhx: pt denies  Psychx: pt denies  Sochx: pt denies etoh, tobacco, and drug use  Famhx: pt denies

## 2022-05-14 NOTE — DISCHARGE NOTE PROVIDER - NSDCCPCAREPLAN_GEN_ALL_CORE_FT
PRINCIPAL DISCHARGE DIAGNOSIS  Diagnosis: Postpartum hypertension  Assessment and Plan of Treatment: - Continue BP meds as prescribed (hold is BP is under 110/60 or HR is under 60)  -Take blood pressure with at home cuff prior to taking medications; if BP is >150/90 call MD  - Return to hospital with headaches, visual changes, abdominal pain, nausea, vomiting, chest pain or shortness of breathe  - Follow up with OB in 2 days for BP check  - Follow up with [facility]

## 2022-05-14 NOTE — CHART NOTE - NSCHARTNOTEFT_GEN_A_CORE
Pt is a 36yo  postpartum day 8 from an  (22 at 38weeks admitted in labor) presenting to triage with blood pressures at home of 130s/100s. OB course complicated by GDMA2 and gHTN postpartum. Patient was discharged with home BP monitoring (no medications). Patient denies headaches, visual changes, RUQ pain. Patient denies palpitations, chest pain, shortness of breathe. She endorses some swelling in her fingers and toes. Patient was monitoring BP at home, concerned due to elevated diastolic readings. Pt has no other OB/ postpartum complaints at this time.     Allergies:NKDA  Meds: PNV    Medhx: pt denies  Obhx:  2022 at 38 weeks c/b ghtn and gdma2;   at 39 weeks uncomplicated,   uncomplicated  Gynhx: pt denies cysts, fibroids, abn paps,stds  Sxhx: pt denies  Psychx: pt denies  Sochx: pt denies etoh, tobacco, and drug use  Famhx: pt denies    PE:  CPN vitals:  BPs: 167/99; 154/103; 137/87  HR:103-120 bpm  pulse ox: 100%  Temp: 37.3    Gen: A+Ox4 NAD  Abd: soft nontender, fundus firm and below umbilicus   CV: rrr  Lungs: CTABL  Ext: trace edema/ negative erythema/ negative tenderness      A+P:   P3 Postpartum day 8 presenting with elevated BPs to rule out postpartum preeclampsia  - HELLP labs stat  - IV insert  - Serial BP monitoring  - If severe range BPs persistent will admit and initiate IVP labetalol vs procardia IR and start MgSo4 and daily BP medications     d/w Dr. Phillips  to be d/w Dr. Maeve Vogt [facility] Pt is a 36yo  postpartum day 8 from an  (22 at 38weeks admitted in labor) presenting to triage with blood pressures at home of 130s/100s. OB course complicated by GDMA2 and gHTN postpartum. Patient was discharged with home BP monitoring (no medications). Patient denies headaches, visual changes, RUQ pain. Patient denies palpitations, chest pain, shortness of breathe. She endorses some swelling in her fingers and toes. Patient was monitoring BP at home, concerned due to elevated diastolic readings. Pt has no other OB/ postpartum complaints at this time.     Allergies:NKDA  Meds: PNV    Medhx: pt denies  Obhx:  2022 at 38 weeks c/b ghtn and gdma2;   at 39 weeks uncomplicated,   uncomplicated  Gynhx: pt denies cysts, fibroids, abn paps,stds  Sxhx: pt denies  Psychx: pt denies  Sochx: pt denies etoh, tobacco, and drug use  Famhx: pt denies    PE:  CPN vitals:  BPs: 167/99; 154/103; 137/87  HR:103-120 bpm  pulse ox: 98% on room air  Temp: 37.3    Gen: A+Ox4 NAD  Abd: soft nontender, fundus firm and below umbilicus   CV: rrr  Lungs: CTABL  Ext: trace edema/ negative erythema/ negative tenderness      A+P:   P3 Postpartum day 8 presenting with elevated BPs to rule out postpartum preeclampsia  - HELLP labs stat  - IV insert  - Serial BP monitoring  - If severe range BPs persistent will admit and initiate IVP labetalol vs procardia IR and start MgSo4 and daily BP medications     d/w Dr. Phillips  to be d/w Dr. Maeve Vogt [facility]

## 2022-05-14 NOTE — PATIENT PROFILE ADULT - FALL HARM RISK - RISK INTERVENTIONS

## 2022-05-14 NOTE — DISCHARGE NOTE PROVIDER - NSDCFUADDAPPT_GEN_ALL_CORE_FT
- Continue BP meds as prescribed (hold is BP is under 110/60 or HR is under 60)  -Take blood pressure with at home cuff prior to taking medications; if BP is >150/90 call MD  - Return to hospital with headaches, visual changes, abdominal pain, nausea, vomiting, chest pain or shortness of breathe  - Follow up with OB in 2 days for BP check  - Follow up with [facility] (email sent for follow up; call [phone])

## 2022-05-14 NOTE — DISCHARGE NOTE PROVIDER - HOSPITAL COURSE
38yo  postpartum day 8 from an  (24 at 38weeks admitted in labor) presenting to triage with blood pressures at home of 130s/100s. OB course complicated by GDMA2 and gHTN postpartum. Patient was discharged with home BP monitoring (no medications). Patient denies headaches, visual changes, RUQ pain. Patient denies palpitations, chest pain, shortness of breathe. She endorses some swelling in her fingers and toes. Patient was monitoring BP at home, concerned due to elevated diastolic readings. Pt has no other OB/ postpartum complaints at this time.

## 2022-05-14 NOTE — H&P ADULT - PROBLEM SELECTOR PLAN 1
- Admit for BP monitoring and initiation of 200mg Labetalol BID per Dr. Mcfarlane  - IV insert  - Serial BP monitoring  - EKG sinus Tach  - Covid swab  -AM HELLP labs  - If severe range BPs persistent will admit and initiate IVP labetalol vs procardia IR and start MgSo4 (no need for MgSo4 initiation at this time d/w MFM fellow Aron)

## 2022-05-14 NOTE — H&P ADULT - ASSESSMENT
A+P:   P3 Postpartum day 8 presenting with elevated BPs to rule out postpartum preeclampsia  - Admit for BP monitoring and initiation of 200mg Labetalol BID per Dr. Mcfarlane  - IV insert  - Serial BP monitoring  - EKG sinus Tach  - Covid swab  -AM HELLP labs  - If severe range BPs persistent will admit and initiate IVP labetalol vs procardia IR and start MgSo4 (no need for MgSo4 initiation at this time d/w MFM fellow Aron)    d/w Dr. Maeve Adornotonja [facility]

## 2022-05-14 NOTE — H&P ADULT - NSHPLABSRESULTS_GEN_ALL_CORE
14.6   9.39  )-----------( 344      ( 14 May 2022 15:20 )             42.9       05-14    139  |  104  |  13  ----------------------------<  99  4.1   |  21<L>  |  0.50    Ca    10.0      14 May 2022 15:20    TPro  7.6  /  Alb  4.4  /  TBili  0.4  /  DBili  x   /  AST  28  /  ALT  43<H>  /  AlkPhos  147<H>  05-14                  PT/INR - ( 14 May 2022 15:20 )   PT: 11.7 sec;   INR: 1.01 ratio         PTT - ( 14 May 2022 15:20 )  PTT:33.8 sec    Lactate Trend            CAPILLARY BLOOD GLUCOSE

## 2022-05-14 NOTE — H&P ADULT - NSICDXFAMHXNEG_GEN_ALL
asthma/atrial fibrillation/brain aneurysm/cancer/congestive heart failure/COPD/coronary disease/dementia/emphysema/heart disease/hypertension/kidney disease/stroke/thyroid disease

## 2022-05-14 NOTE — DISCHARGE NOTE PROVIDER - CARE PROVIDER_API CALL
Rebecca Mcfarlane  OBSTETRICS AND GYNECOLOGY  [address], [address]  [place] [address]  Phone: [phone]  Fax: [phone]  Follow Up Time:

## 2022-05-15 ENCOUNTER — TRANSCRIPTION ENCOUNTER (OUTPATIENT)
Age: 37
End: 2022-05-15

## 2022-05-15 VITALS
RESPIRATION RATE: 17 BRPM | DIASTOLIC BLOOD PRESSURE: 72 MMHG | HEART RATE: 100 BPM | OXYGEN SATURATION: 100 % | SYSTOLIC BLOOD PRESSURE: 114 MMHG | TEMPERATURE: 98 F

## 2022-05-15 LAB
ALBUMIN SERPL ELPH-MCNC: 3.7 G/DL — SIGNIFICANT CHANGE UP (ref 3.3–5)
ALP SERPL-CCNC: 123 U/L — HIGH (ref 40–120)
ALT FLD-CCNC: 33 U/L — SIGNIFICANT CHANGE UP (ref 4–33)
ANION GAP SERPL CALC-SCNC: 13 MMOL/L — SIGNIFICANT CHANGE UP (ref 7–14)
APTT BLD: 32 SEC — SIGNIFICANT CHANGE UP (ref 27–36.3)
AST SERPL-CCNC: 20 U/L — SIGNIFICANT CHANGE UP (ref 4–32)
BASOPHILS # BLD AUTO: 0.05 K/UL — SIGNIFICANT CHANGE UP (ref 0–0.2)
BASOPHILS NFR BLD AUTO: 0.6 % — SIGNIFICANT CHANGE UP (ref 0–2)
BILIRUB SERPL-MCNC: 0.5 MG/DL — SIGNIFICANT CHANGE UP (ref 0.2–1.2)
BUN SERPL-MCNC: 12 MG/DL — SIGNIFICANT CHANGE UP (ref 7–23)
CALCIUM SERPL-MCNC: 9.6 MG/DL — SIGNIFICANT CHANGE UP (ref 8.4–10.5)
CHLORIDE SERPL-SCNC: 104 MMOL/L — SIGNIFICANT CHANGE UP (ref 98–107)
CO2 SERPL-SCNC: 20 MMOL/L — LOW (ref 22–31)
CREAT SERPL-MCNC: 0.42 MG/DL — LOW (ref 0.5–1.3)
EGFR: 131 ML/MIN/1.73M2 — SIGNIFICANT CHANGE UP
EOSINOPHIL # BLD AUTO: 0.38 K/UL — SIGNIFICANT CHANGE UP (ref 0–0.5)
EOSINOPHIL NFR BLD AUTO: 4.9 % — SIGNIFICANT CHANGE UP (ref 0–6)
FIBRINOGEN PPP-MCNC: 531 MG/DL — HIGH (ref 330–520)
GLUCOSE SERPL-MCNC: 91 MG/DL — SIGNIFICANT CHANGE UP (ref 70–99)
HCT VFR BLD CALC: 37.1 % — SIGNIFICANT CHANGE UP (ref 34.5–45)
HGB BLD-MCNC: 12.8 G/DL — SIGNIFICANT CHANGE UP (ref 11.5–15.5)
IANC: 4.13 K/UL — SIGNIFICANT CHANGE UP (ref 1.8–7.4)
IMM GRANULOCYTES NFR BLD AUTO: 1.2 % — SIGNIFICANT CHANGE UP (ref 0–1.5)
INR BLD: 1.04 RATIO — SIGNIFICANT CHANGE UP (ref 0.88–1.16)
LDH SERPL L TO P-CCNC: 185 U/L — SIGNIFICANT CHANGE UP (ref 135–225)
LYMPHOCYTES # BLD AUTO: 2.3 K/UL — SIGNIFICANT CHANGE UP (ref 1–3.3)
LYMPHOCYTES # BLD AUTO: 29.5 % — SIGNIFICANT CHANGE UP (ref 13–44)
MCHC RBC-ENTMCNC: 32 PG — SIGNIFICANT CHANGE UP (ref 27–34)
MCHC RBC-ENTMCNC: 34.5 GM/DL — SIGNIFICANT CHANGE UP (ref 32–36)
MCV RBC AUTO: 92.8 FL — SIGNIFICANT CHANGE UP (ref 80–100)
MONOCYTES # BLD AUTO: 0.85 K/UL — SIGNIFICANT CHANGE UP (ref 0–0.9)
MONOCYTES NFR BLD AUTO: 10.9 % — SIGNIFICANT CHANGE UP (ref 2–14)
NEUTROPHILS # BLD AUTO: 4.13 K/UL — SIGNIFICANT CHANGE UP (ref 1.8–7.4)
NEUTROPHILS NFR BLD AUTO: 52.9 % — SIGNIFICANT CHANGE UP (ref 43–77)
NRBC # BLD: 0 /100 WBCS — SIGNIFICANT CHANGE UP
NRBC # FLD: 0 K/UL — SIGNIFICANT CHANGE UP
PLATELET # BLD AUTO: 291 K/UL — SIGNIFICANT CHANGE UP (ref 150–400)
POTASSIUM SERPL-MCNC: 3.9 MMOL/L — SIGNIFICANT CHANGE UP (ref 3.5–5.3)
POTASSIUM SERPL-SCNC: 3.9 MMOL/L — SIGNIFICANT CHANGE UP (ref 3.5–5.3)
PROT SERPL-MCNC: 6.8 G/DL — SIGNIFICANT CHANGE UP (ref 6–8.3)
PROTHROM AB SERPL-ACNC: 12.1 SEC — SIGNIFICANT CHANGE UP (ref 10.5–13.4)
RBC # BLD: 4 M/UL — SIGNIFICANT CHANGE UP (ref 3.8–5.2)
RBC # FLD: 13.3 % — SIGNIFICANT CHANGE UP (ref 10.3–14.5)
SODIUM SERPL-SCNC: 137 MMOL/L — SIGNIFICANT CHANGE UP (ref 135–145)
URATE SERPL-MCNC: 6.1 MG/DL — SIGNIFICANT CHANGE UP (ref 2.5–7)
WBC # BLD: 7.8 K/UL — SIGNIFICANT CHANGE UP (ref 3.8–10.5)
WBC # FLD AUTO: 7.8 K/UL — SIGNIFICANT CHANGE UP (ref 3.8–10.5)

## 2022-05-15 RX ORDER — LABETALOL HCL 100 MG
1 TABLET ORAL
Qty: 60 | Refills: 0
Start: 2022-05-15 | End: 2022-06-13

## 2022-05-15 RX ORDER — LABETALOL HCL 100 MG
1 TABLET ORAL
Qty: 0 | Refills: 0 | DISCHARGE
Start: 2022-05-15

## 2022-05-15 RX ADMIN — Medication 200 MILLIGRAM(S): at 05:18

## 2022-05-15 NOTE — DISCHARGE NOTE OB - NS MD DC FALL RISK RISK
For information on Fall & Injury Prevention, visit: [email or web address] OR  [email or web address] OR  https://www.cdc.gov/steadi/patient.html

## 2022-05-15 NOTE — PROGRESS NOTE ADULT - ASSESSMENT
A/P: 38yo PPD#9 s/p  c/b gHTN, admitted with elevated BPs, without s/sx PEC or lab abnormalities. Pt admitted for BP monitoring. BPs mild range overnight.  Patient is stable and doing well post-partum.      #gHTN  - Continue Labetalol 200 BID  - Monitor BPs  - AM HELLP    #Postpartum  - Pain well controlled, continue current pain regimen  - Increase ambulation, SCDs when not ambulating  - Continue regular diet  - Discharge planning     Roney PGY3

## 2022-05-15 NOTE — DISCHARGE NOTE OB - CARE PLAN
Principal Discharge DX:	Postpartum hypertension  Assessment and plan of treatment:	Continue Labetalol   1

## 2022-05-15 NOTE — DISCHARGE NOTE NURSING/CASE MANAGEMENT/SOCIAL WORK - NSDCPEFALRISK_GEN_ALL_CORE
For information on Fall & Injury Prevention, visit: [email or web address] OR  [email or web address]/[email or web address] OR  https://www.cdc.gov/steadi/patient.html

## 2022-05-15 NOTE — DISCHARGE NOTE OB - MEDICATION SUMMARY - MEDICATIONS TO TAKE
I will START or STAY ON the medications listed below when I get home from the hospital:    labetalol 200 mg oral tablet  -- 1 tab(s) by mouth 2 times a day  -- Indication: For Postpartum hypertension    Prenatal 1 oral capsule  -- orally once a day  -- Indication: For Postpartum hypertension

## 2022-05-15 NOTE — DISCHARGE NOTE OB - PATIENT PORTAL LINK FT
You can access the FollowMyHealth Patient Portal offered by [facility] by registering at the following website: [email or web address]. By joining [employer]’s FollowMyHealth portal, you will also be able to view your health information using other applications (apps) compatible with our system.

## 2022-05-15 NOTE — DISCHARGE NOTE NURSING/CASE MANAGEMENT/SOCIAL WORK - PATIENT PORTAL LINK FT
You can access the FollowMyHealth Patient Portal offered by [facility] by registering at the following website: [email or web address]. By joining [employer]’s FollowMyHealth portal, you will also be able to view your health information using other applications (apps) compatible with our system.

## 2022-05-15 NOTE — DISCHARGE NOTE NURSING/CASE MANAGEMENT/SOCIAL WORK - NSDCFUADDAPPT_GEN_ALL_CORE_FT
- Continue BP meds as prescribed (hold is BP is under 110/60 or HR is under 60)  -Take blood pressure with at home cuff prior to taking medications; if BP is >150/90 call MD  - Return to hospital with headaches, visual changes, abdominal pain, nausea, vomiting, chest pain or shortness of breathe  - Follow up with OB in 2 days for BP check  - Follow up with [facility] (email sent for follow up; call [phone])

## 2022-05-15 NOTE — DISCHARGE NOTE OB - CARE PROVIDER_API CALL
Rebecca Mcfarlane  OBSTETRICS AND GYNECOLOGY  [address], [address]  [place] [address]  Phone: [phone]  Fax: [phone]  Established Patient  Follow Up Time: 1 week

## 2022-05-15 NOTE — PROGRESS NOTE ADULT - SUBJECTIVE AND OBJECTIVE BOX
OB Progress Note:  PPD#9 HD#2    S: 38yo Patient feels well. Pain is well controlled. She is tolerating a regular diet and passing flatus. She is voiding spontaneously, and ambulating without difficulty. Denies CP/SOB. Denies lightheadedness/dizziness. Denies N/V.  Pt denies HA, vision changes, blurry vision, RUQ pain.      O:  Vitals:   Vital Signs Last 24 Hrs  T(C): 36.8 (14 May 2022 22:00), Max: 36.8 (14 May 2022 17:23)  T(F): 98.2 (14 May 2022 22:00), Max: 98.3 (14 May 2022 17:23)  HR: 98 (15 May 2022 01:34) (97 - 100)  BP: 111/64 (15 May 2022 01:34) (111/64 - 116/73)  BP(mean): --  RR: 17 (15 May 2022 01:34) (17 - 20)  SpO2: 100% (15 May 2022 01:34) (100% - 100%)    MEDICATIONS  (STANDING):  labetalol 200 milliGRAM(s) Oral two times a day  prenatal multivitamin 1 Tablet(s) Oral daily    MEDICATIONS  (PRN):  benzocaine 20%/menthol 0.5% Spray 1 Spray(s) Topical every 6 hours PRN for Perineal discomfort  dibucaine 1% Ointment 1 Application(s) Topical every 6 hours PRN Perineal discomfort  diphenhydrAMINE 25 milliGRAM(s) Oral every 6 hours PRN Pruritus  hydrocortisone 1% Cream 1 Application(s) Topical every 6 hours PRN Moderate Pain (4-6)  lanolin Ointment 1 Application(s) Topical every 6 hours PRN nipple soreness  pramoxine 1%/zinc 5% Cream 1 Application(s) Topical every 4 hours PRN Moderate Pain (4-6)      Labs:  Blood type: O Positive  Rubella IgG: RPR: Negative                          14.6   9.39 >-----------< 344    (  @ 15:20 )             42.9    22 @ 15:20      139  |  104  |  13  ----------------------------<  99  4.1   |  21<L>  |  0.50        Ca    10.0      14 May 2022 15:20    TPro  7.6  /  Alb  4.4  /  TBili  0.4  /  DBili  x   /  AST  28  /  ALT  43<H>  /  AlkPhos  147<H>  22 @ 15:20          Physical Exam:  General: NAD  Abdomen: soft, non-tender, non-distended, fundus firm  Vaginal: Lochia wnl  Extremities: No erythema/edema

## 2022-07-22 NOTE — OB PROVIDER H&P - PRO INTERPRETER NEED 2
English Tazorac Pregnancy And Lactation Text: This medication is not safe during pregnancy. It is unknown if this medication is excreted in breast milk.

## 2022-11-10 NOTE — OB RN TRIAGE NOTE - NSICDXNOPASTSURGICALHX_GEN_ALL_CORE
Quality 130: Documentation Of Current Medications In The Medical Record: Current Medications Documented
Detail Level: Generalized
<-- Click to add NO significant Past Surgical History

## 2022-12-06 PROBLEM — Z00.00 ENCOUNTER FOR PREVENTIVE HEALTH EXAMINATION: Status: ACTIVE | Noted: 2022-12-06

## 2023-05-18 NOTE — OB RN PATIENT PROFILE - NS TRANSFER DISPOSITION PATIENT BELONGINGS
Pt restarted on clear diabetic liquids  Medicated for abdominal pain  C/o diziness when ambulating  Zofran helpful with nausea  Pt remains very weak  given to family

## 2024-04-24 NOTE — OB RN PATIENT PROFILE - NS_MODEOFARRIVAL_OBGYN_ALL_OB
Attempted to reach parent regarding results- no answer. L/m informing parent this LPN will send a portal message and requesting parent c/b to schedule a virtual visit.    Car

## 2024-06-08 NOTE — H&P ADULT - NSICDXPASTSURGICALHX_GEN_ALL_CORE_FT
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    Leisure & Hobbies: Read, goes camping with spouse, road trip         Social Determinants of Health     Financial Resource Strain: Low Risk  (4/25/2023)    Overall Financial Resource Strain (CARDIA)     Difficulty of Paying Living Expenses: Not very hard   Food Insecurity: No Food Insecurity (5/29/2024)    Hunger Vital Sign     Worried About Running Out of Food in the Last Year: Never true     Ran Out of Food in the Last Year: Never true   Transportation Needs: No Transportation Needs (5/29/2024)    PRAPARE - Transportation     Lack of Transportation (Medical): No     Lack of Transportation (Non-Medical): No   Physical Activity: Inactive (5/14/2024)    Exercise Vital Sign     Days of Exercise per Week: 0 days     Minutes of Exercise per Session: 0 min   Stress: Stress Concern Present (11/21/2019)    [employer] - Occupational Stress Questionnaire     Feeling of Stress : Rather much   Social Connections: Moderately Integrated (11/21/2019)    Social Connection and Isolation Panel [NHANES]     Frequency of Communication with Friends and Family: More than three times a week     Frequency of Social Gatherings with Friends and Family: More than three times a week     Attends Worship Services: More than 4 times per year     Active Member of Clubs or Organizations: No     Attends Club or Organization Meetings: Never     Marital Status:    Intimate Partner Violence: Not At Risk (11/21/2019)    Humiliation, Afraid, Rape, and Kick  PAST SURGICAL HISTORY:  No significant past surgical history

## 2024-10-16 NOTE — OB RN DELIVERY SUMMARY - BABY A: APGAR 1 MIN MUSCLE TONE, DELIVERY
Patient Clipped and Prepped: chest. Prepped with ChloraPrep, a minimum of 3 minute dry time, longer if needed, no pooling noted, patient draped in sterile fashion. (2) well flexed

## 2025-07-11 NOTE — OB RN PATIENT PROFILE - NS_GESTAGE_OBGYN_ALL_OB_FT
CSWD to LLE postero/lateral wound using scalpel to remove ~2cm2 slough layer from wound bed. Pt tolerated well.    Multilayer compression wrap applied to LLE. Pt states the wrap is comfortable.   39w1d